# Patient Record
Sex: FEMALE | Race: BLACK OR AFRICAN AMERICAN | NOT HISPANIC OR LATINO | Employment: UNEMPLOYED | URBAN - METROPOLITAN AREA
[De-identification: names, ages, dates, MRNs, and addresses within clinical notes are randomized per-mention and may not be internally consistent; named-entity substitution may affect disease eponyms.]

---

## 2019-03-07 ENCOUNTER — ANCILLARY PROCEDURE (OUTPATIENT)
Dept: BONE DENSITY | Facility: CLINIC | Age: 59
End: 2019-03-07
Attending: FAMILY MEDICINE
Payer: COMMERCIAL

## 2019-03-07 DIAGNOSIS — Z78.0 POST-MENOPAUSAL: ICD-10-CM

## 2019-03-07 PROCEDURE — 77081 DXA BONE DENSITY APPENDICULR: CPT

## 2019-03-07 PROCEDURE — 77080 DXA BONE DENSITY AXIAL: CPT

## 2019-10-11 ENCOUNTER — TRANSFERRED RECORDS (OUTPATIENT)
Dept: HEALTH INFORMATION MANAGEMENT | Facility: CLINIC | Age: 59
End: 2019-10-11

## 2019-11-19 ENCOUNTER — ANCILLARY PROCEDURE (OUTPATIENT)
Dept: GENERAL RADIOLOGY | Facility: CLINIC | Age: 59
End: 2019-11-19
Attending: FAMILY MEDICINE
Payer: COMMERCIAL

## 2019-11-19 ENCOUNTER — OFFICE VISIT (OUTPATIENT)
Dept: ORTHOPEDICS | Facility: CLINIC | Age: 59
End: 2019-11-19
Payer: COMMERCIAL

## 2019-11-19 DIAGNOSIS — M25.561 ACUTE PAIN OF RIGHT KNEE: Primary | ICD-10-CM

## 2019-11-19 DIAGNOSIS — M25.511 ACUTE PAIN OF RIGHT SHOULDER: ICD-10-CM

## 2019-11-19 RX ORDER — DICLOFENAC SODIUM 75 MG/1
75 TABLET, DELAYED RELEASE ORAL 2 TIMES DAILY PRN
Qty: 60 TABLET | Refills: 1 | Status: SHIPPED | OUTPATIENT
Start: 2019-11-19 | End: 2021-12-07

## 2019-11-19 NOTE — PROGRESS NOTES
SPORTS & ORTHOPEDIC WALK-IN VISIT 11/19/2019    Primary Care Physician: DrMarcia     Has fallen 3 times in the past that has bothered her knee. Most recent fall was in August. Pain has consistently gotten worse since the last fall.  Notices clicking in her knee.    Also notices some R shoulder pain as well due to the most recent fall. Having significant difficulty carrying or using her right arm.    Has received MRI and XR of her knee (not in the system).  Referred to orthopedics for continued care.  When asked where pain is, she gestures to the general area of her knee.    Reason for visit:     What part of your body is injured / painful?  right knee    What caused the injury /pain? Fall    How long ago did your injury occur or pain begin? several months ago    What are your most bothersome symptoms? Pain    How would you characterize your symptom?  aching and dull    What makes your symptoms better? Rest    What makes your symptoms worse? Standing, Walking and Other: stairs    Have you been previously seen for this problem? Yes, [facility]    Medical History:    Any recent changes to your medical history? No    Any new medication prescribed since last visit? No    Have you had surgery on this body part before? No    Social History:    Occupation: not working    Handedness: Right    Exercise: None    Review of Systems:    Do you have fever, chills, weight loss? No    Do you have any vision problems? No    Do you have any chest pain or edema? No    Do you have any shortness of breath or wheezing?  No    Do you have stomach problems? No    Do you have any numbness or focal weakness? No    Do you have diabetes? No    Do you have problems with bleeding or clotting? No    Do you have an rashes or other skin lesions? No       CHIEF COMPLAINT:  Pain of the Right Knee       HISTORY OF PRESENT ILLNESS  Ms. Rocha is a pleasant 59 year old year old female who presents to clinic today with right knee pain and right shoulder  pain.  Radha has had right knee pain for the past few years, much worse as of the past few months.  She suffered a fall in August that incited this pain, she has suffered 2 falls since.  She does not recall striking her knee she may have twisted the knee during the fall, she is unsure.  She points to her generally, tenderness throughout.  This is worse with bearing weight, especially worse if she walks stairs.  During a fall she also fell on her right side, she has had pain in her general lateral and anterior shoulder since then.  She has pain with most movement, especially when flexing her arm above her head.  This is her dominant arm.        Additional history: as documented    MEDICAL HISTORY  Patient Active Problem List   Diagnosis   (none) - all problems resolved or deleted       Current Outpatient Medications   Medication Sig Dispense Refill     erythromycin (ROMYCIN) ophthalmic ointment Place 0.25 inches into the right eye At Bedtime 3.5 g 1     ibuprofen (ADVIL,MOTRIN) 600 MG tablet Take 1 tablet (600 mg) by mouth every 6 hours as needed for moderate pain 60 tablet 5       No Known Allergies    No family history on file.    Additional medical/Social/Surgical histories reviewed in [facility] and updated as appropriate.        PHYSICAL EXAM      General  - normal appearance, in no obvious distress  CV  - normal popliteal pulse  Pulm  - normal respiratory pattern, non-labored  Musculoskeletal - right knee  - stance: mildly antalgic gait  - inspection: generalized swelling  - palpation: generally tender, most so anteriorly  - ROM: 100 degrees flexion, 0 degrees extension, painful active ROM  - special tests:  Unable to perform    Musculoskeletal - right shoulder  - inspection: normal bone and joint alignment, no obvious deformity, no scapular winging, no AC step-off  - palpation: tender RC insertion, ACJ, biceps origin   - ROM: painful and limited flexion  - strength: 5/5  strength, 5/5 in all shoulder planes  -  special tests:  (-) Speed's  (+) Neer  (+) Hawkin's  (+) Eduardo's    Neuro  - no sensory or motor deficit, grossly normal coordination, normal muscle tone  Skin  - no ecchymosis, erythema, warmth, or induration, no obvious rash  Psych  - interactive, appropriate, normal mood and affect               ASSESSMENT & PLAN  Ms. Rocha is a 59 year old year old female who presents to clinic today with right knee pain and right shoulder pain that are both acute on chronic.    I ordered and reviewed x-rays of her right knee and right shoulder.  Her right knee x-ray does reveal generalized osteoarthritis with no obvious acute issue.  Her right shoulder x-ray shows no acute issue but does show some superior lateral humeral head enthesophyte formation.    Her knee pain is likely secondary to an arthritic flare or bone bruise that may have been caused by her fall, or at least exacerbated.  We discussed the utility of protection with a brace, anti-inflammatory usage, and intra-articular injections.  I did provide her brace, she should wear this at all times when weightbearing.    With regards to her shoulder she has clinical evidence of rotator cuff tendinopathy.  I am prescribing her a brief course of diclofenac for both of these issues.  We also discussed corticosteroid injections and physical therapy, both of which I do think she would benefit from.  She declined physical therapy today.    It was a pleasure seeing Radha today.    Javier Elise DO, [facility]  Primary Care Sports Medicine      This note was constructed using Dragon dictation software, please excuse any minor errors in spelling, grammar, or syntax.

## 2019-11-19 NOTE — LETTER
11/19/2019       RE: Radha Rocha  [address] [address]     Dear Colleague,    Thank you for referring your patient, Radha Rocha, to the [facility] SPORTS AND ORTHOPAEDIC WALK IN CLINIC at [facility]. Please see a copy of my visit note below.          SPORTS & ORTHOPEDIC WALK-IN VISIT 11/19/2019    Primary Care Physician:      Has fallen 3 times in the past that has bothered her knee. Most recent fall was in August. Pain has consistently gotten worse since the last fall.  Notices clicking in her knee.    Also notices some R shoulder pain as well due to the most recent fall. Having significant difficulty carrying or using her right arm.    Has received MRI and XR of her knee (not in the system).  Referred to orthopedics for continued care.  When asked where pain is, she gestures to the general area of her knee.    Reason for visit:     What part of your body is injured / painful?  right knee    What caused the injury /pain? Fall    How long ago did your injury occur or pain begin? several months ago    What are your most bothersome symptoms? Pain    How would you characterize your symptom?  aching and dull    What makes your symptoms better? Rest    What makes your symptoms worse? Standing, Walking and Other: stairs    Have you been previously seen for this problem? Yes, [facility]    Medical History:    Any recent changes to your medical history? No    Any new medication prescribed since last visit? No    Have you had surgery on this body part before? No    Social History:    Occupation: not working    Handedness: Right    Exercise: None    Review of Systems:    Do you have fever, chills, weight loss? No    Do you have any vision problems? No    Do you have any chest pain or edema? No    Do you have any shortness of breath or wheezing?  No    Do you have stomach problems? No    Do you have any numbness or focal weakness? No    Do you have diabetes?  No    Do you have problems with bleeding or clotting? No    Do you have an rashes or other skin lesions? No       CHIEF COMPLAINT:  Pain of the Right Knee       HISTORY OF PRESENT ILLNESS  Ms. Rocha is a pleasant 59 year old year old female who presents to clinic today with right knee pain and right shoulder pain.  Radha has had right knee pain for the past few years, much worse as of the past few months.  She suffered a fall in August that incited this pain, she has suffered 2 falls since.  She does not recall striking her knee she may have twisted the knee during the fall, she is unsure.  She points to her generally, tenderness throughout.  This is worse with bearing weight, especially worse if she walks stairs.  During a fall she also fell on her right side, she has had pain in her general lateral and anterior shoulder since then.  She has pain with most movement, especially when flexing her arm above her head.  This is her dominant arm.        Additional history: as documented    MEDICAL HISTORY  Patient Active Problem List   Diagnosis   (none) - all problems resolved or deleted       Current Outpatient Medications   Medication Sig Dispense Refill     erythromycin (ROMYCIN) ophthalmic ointment Place 0.25 inches into the right eye At Bedtime 3.5 g 1     ibuprofen (ADVIL,MOTRIN) 600 MG tablet Take 1 tablet (600 mg) by mouth every 6 hours as needed for moderate pain 60 tablet 5       No Known Allergies    No family history on file.    Additional medical/Social/Surgical histories reviewed in [facility] and updated as appropriate.        PHYSICAL EXAM      General  - normal appearance, in no obvious distress  CV  - normal popliteal pulse  Pulm  - normal respiratory pattern, non-labored  Musculoskeletal - right knee  - stance: mildly antalgic gait  - inspection: generalized swelling  - palpation: generally tender, most so anteriorly  - ROM: 100 degrees flexion, 0 degrees extension, painful active ROM  - special  tests:  Unable to perform    Musculoskeletal - right shoulder  - inspection: normal bone and joint alignment, no obvious deformity, no scapular winging, no AC step-off  - palpation: tender RC insertion, ACJ, biceps origin   - ROM: painful and limited flexion  - strength: 5/5  strength, 5/5 in all shoulder planes  - special tests:  (-) Speed's  (+) Neer  (+) Hawkin's  (+) Eduardo's    Neuro  - no sensory or motor deficit, grossly normal coordination, normal muscle tone  Skin  - no ecchymosis, erythema, warmth, or induration, no obvious rash  Psych  - interactive, appropriate, normal mood and affect               ASSESSMENT & PLAN  Ms. Rocha is a 59 year old year old female who presents to clinic today with right knee pain and right shoulder pain that are both acute on chronic.    I ordered and reviewed x-rays of her right knee and right shoulder.  Her right knee x-ray does reveal generalized osteoarthritis with no obvious acute issue.  Her right shoulder x-ray shows no acute issue but does show some superior lateral humeral head enthesophyte formation.    Her knee pain is likely secondary to an arthritic flare or bone bruise that may have been caused by her fall, or at least exacerbated.  We discussed the utility of protection with a brace, anti-inflammatory usage, and intra-articular injections.  I did provide her brace, she should wear this at all times when weightbearing.    With regards to her shoulder she has clinical evidence of rotator cuff tendinopathy.  I am prescribing her a brief course of diclofenac for both of these issues.  We also discussed corticosteroid injections and physical therapy, both of which I do think she would benefit from.  She declined physical therapy today.    It was a pleasure seeing Radha today.    Javier Elise DO, [facility]  Primary Care Sports Medicine      This note was constructed using Dragon dictation software, please excuse any minor errors in spelling, grammar, or syntax.

## 2020-10-29 ENCOUNTER — HOSPITAL ENCOUNTER (OUTPATIENT)
Dept: CT IMAGING | Facility: CLINIC | Age: 60
Discharge: HOME | End: 2020-10-29
Attending: FAMILY MEDICINE | Admitting: FAMILY MEDICINE
Payer: COMMERCIAL

## 2020-10-29 DIAGNOSIS — G89.29 CHRONIC INTRACTABLE HEADACHE, UNSPECIFIED HEADACHE TYPE: ICD-10-CM

## 2020-10-29 DIAGNOSIS — R51.9 CHRONIC INTRACTABLE HEADACHE, UNSPECIFIED HEADACHE TYPE: ICD-10-CM

## 2020-10-29 PROCEDURE — 70450 CT HEAD/BRAIN W/O DYE: CPT | Mod: 26 | Performed by: RADIOLOGY

## 2020-10-29 PROCEDURE — 70450 CT HEAD/BRAIN W/O DYE: CPT

## 2021-12-01 ENCOUNTER — MEDICAL CORRESPONDENCE (OUTPATIENT)
Dept: HEALTH INFORMATION MANAGEMENT | Facility: CLINIC | Age: 61
End: 2021-12-01
Payer: COMMERCIAL

## 2021-12-01 ENCOUNTER — TRANSCRIBE ORDERS (OUTPATIENT)
Dept: OTHER | Age: 61
End: 2021-12-01
Payer: COMMERCIAL

## 2021-12-01 DIAGNOSIS — G89.29 CHRONIC PAIN OF RIGHT KNEE: Primary | ICD-10-CM

## 2021-12-01 DIAGNOSIS — M25.561 CHRONIC PAIN OF RIGHT KNEE: Primary | ICD-10-CM

## 2021-12-02 NOTE — TELEPHONE ENCOUNTER
DIAGNOSIS: chronic pain right knee,,no imaging   APPOINTMENT DATE: 12.7.21   NOTES STATUS DETAILS   OFFICE NOTE from referring provider Care Everywhere 12.1.21 Dr hu Briones, [facility]   10.13.21  9.11.19   OFFICE NOTE from other specialist Internal 11.19.19 Dr Javier Elise, [facility]   DISCHARGE SUMMARY from hospital N/A    DISCHARGE REPORT from the ER N/A    OPERATIVE REPORT N/A    EMG report N/A    MEDICATION LIST Internal    MRI PACS 10.11.19 R knee   DEXA (osteoporosis/bone health) Internal    CT SCAN N/A    XRAYS (IMAGES & REPORTS) Internal 11.19.19 R knee     Action 12.2.21 10:30 AM YUNIEL    Action Taken Called [facility] to have image pushed

## 2021-12-06 DIAGNOSIS — M25.569 KNEE PAIN: Primary | ICD-10-CM

## 2021-12-06 NOTE — PROGRESS NOTES
"ASSESSMENT/PLAN:    (M17.0) Primary osteoarthritis of knees, bilateral  (primary encounter diagnosis)  Comment: reviewed exam and imaging at length; bilateral cortisone today; ace wraps; meds adjusted; encouraged PT; f/u in 3 months; anticipatory guidance given; live  present through entire visit   Plan: lidocaine (PF) (XYLOCAINE) 1 % injection, triamcinolone (KENALOG-40) 40 MG/ML injection,         Large Joint Injection: bilateral knee, PHYSICAL THERAPY REFERRAL (Internal), meloxicam (MOBIC) 15 MG tablet            Alon Cee MD  December 7, 2021  12:25 PM          Pt is a 61 year old female referred by Dr Briones here today for:     Right Knee pain :   Duration? 3 years   Injury/ Inciting activity? Pt has fallen before but cannot remember a specific injury to the knee. Pt notes pain while kneeling down. Feeling as if something is \"poking her\"  Pop? No   Swelling? Sometimes   Limited motion? When she is having a flare up she notices a decrease in flexion   Locking/ Catching? No   Giving way/ instability? Yes   Imaging? 11/2019 and today   Treatment? Pain medication, ice, heat; does not know the name -> per record review was capsaicin and tylenol -> no relief     Pain w/ prolonged walking; difficulty w/ stairs     No past medical history on file.   Past Surgical History:   Procedure Laterality Date     EYE SURGERY      Right eye removal      Current Outpatient Medications   Medication Sig Dispense Refill     erythromycin (ROMYCIN) ophthalmic ointment Place 0.25 inches into the right eye At Bedtime 3.5 g 1      No Known Allergies     ROS:   Gen- no fevers/chills   Rheum - no morning stiffness   Derm - no rash/ redness   Neuro - no numbness, no tingling   Remainder of ROS negative.     Exam:     Bilateral Knees:   ROM: 0-100; Crepitus: YES   Effusion: NO ; Swelling: NO   Strength: Full in flexion/ extension   Tenderness: Patella - Yes Medial joint line - Yes; Lateral joint line - Yes; Quad tendon - NO; " Patellar tendon- YES; Hamstring - YES.   Cruciates:  Lachman - neg   Collaterals: varus -neg/valgus -neg.   Patella: patellar compression - neg; single leg bend- pos  Meniscus: Marley - pos      [place] [facility] [place] patellofemoral [place]       Procedure Note:  The pt was verbally consented. The R knee was sterilely prepped in usual fashion. 1cc of 40mg/mL Kenalog and 4 cc of 1% lidocaine was injected via lateral approach without complication. Pt tolerated procedure well.     The pt was verbally consented. The L knee was sterilely prepped in usual fashion. 1cc of 40mg/mL Kenalog and 4 cc of 1% lidocaine was injected via lateral approach without complication. Pt tolerated procedure well.     Large Joint Injection: bilateral knee    Date/Time: 12/7/2021 12:17 PM  Performed by: Alon Cee MD  Authorized by: Alon Cee MD     Indications:  Pain  Needle Size:  22 G  Guidance: landmark guided    Approach:  Anterolateral  Location:  Knee  Laterality:  Bilateral      Medications (Right):  40 mg triamcinolone 40 MG/ML; 4 mL lidocaine (PF) 1 %  Medications (Left):  40 mg triamcinolone 40 MG/ML; 4 mL lidocaine (PF) 1 %  Outcome:  Tolerated well, no immediate complications  Procedure discussed: discussed risks, benefits, and alternatives    Consent Given by:  Patient  Timeout: timeout called immediately prior to procedure    Prep: patient was prepped and draped in usual sterile fashion     Rj Wright ATC on 12/7/2021 at 12:19 PM

## 2021-12-07 ENCOUNTER — PRE VISIT (OUTPATIENT)
Dept: ORTHOPEDICS | Facility: CLINIC | Age: 61
End: 2021-12-07

## 2021-12-07 ENCOUNTER — ANCILLARY PROCEDURE (OUTPATIENT)
Dept: GENERAL RADIOLOGY | Facility: CLINIC | Age: 61
End: 2021-12-07
Attending: FAMILY MEDICINE
Payer: COMMERCIAL

## 2021-12-07 ENCOUNTER — OFFICE VISIT (OUTPATIENT)
Dept: ORTHOPEDICS | Facility: CLINIC | Age: 61
End: 2021-12-07
Payer: COMMERCIAL

## 2021-12-07 DIAGNOSIS — M25.569 KNEE PAIN: ICD-10-CM

## 2021-12-07 DIAGNOSIS — M17.0 PRIMARY OSTEOARTHRITIS OF KNEES, BILATERAL: Primary | ICD-10-CM

## 2021-12-07 PROCEDURE — 99202 OFFICE O/P NEW SF 15 MIN: CPT | Mod: 25 | Performed by: FAMILY MEDICINE

## 2021-12-07 PROCEDURE — 20610 DRAIN/INJ JOINT/BURSA W/O US: CPT | Mod: 50 | Performed by: FAMILY MEDICINE

## 2021-12-07 PROCEDURE — 73562 X-RAY EXAM OF KNEE 3: CPT | Mod: LT | Performed by: RADIOLOGY

## 2021-12-07 RX ORDER — MELOXICAM 15 MG/1
15 TABLET ORAL DAILY
Qty: 30 TABLET | Refills: 3 | Status: SHIPPED | OUTPATIENT
Start: 2021-12-07 | End: 2021-12-07

## 2021-12-07 RX ORDER — TRIAMCINOLONE ACETONIDE 40 MG/ML
40 INJECTION, SUSPENSION INTRA-ARTICULAR; INTRAMUSCULAR
Status: SHIPPED | OUTPATIENT
Start: 2021-12-07

## 2021-12-07 RX ORDER — MELOXICAM 15 MG/1
15 TABLET ORAL DAILY
Qty: 30 TABLET | Refills: 3 | Status: SHIPPED | OUTPATIENT
Start: 2021-12-07 | End: 2022-03-08

## 2021-12-07 RX ORDER — LIDOCAINE HYDROCHLORIDE 10 MG/ML
4 INJECTION, SOLUTION EPIDURAL; INFILTRATION; INTRACAUDAL; PERINEURAL
Status: SHIPPED | OUTPATIENT
Start: 2021-12-07

## 2021-12-07 RX ADMIN — LIDOCAINE HYDROCHLORIDE 4 ML: 10 INJECTION, SOLUTION EPIDURAL; INFILTRATION; INTRACAUDAL; PERINEURAL at 12:17

## 2021-12-07 RX ADMIN — TRIAMCINOLONE ACETONIDE 40 MG: 40 INJECTION, SUSPENSION INTRA-ARTICULAR; INTRAMUSCULAR at 12:17

## 2021-12-07 NOTE — NURSING NOTE
[facility] SPORTS [address] [address]  Dept: [phone]  ______________________________________________________________________________    Patient: Radha Rocha   : 1960   MRN: [id number]   2021    INVASIVE PROCEDURE SAFETY CHECKLIST    Date: 21   Procedure: Bilateral Knee IA CSI jaylenealog  Patient Name: Radha Rocha  MRN: [id number]  YOB: 1960    Action: Complete sections as appropriate. Any discrepancy results in a HARD COPY until resolved.     PRE PROCEDURE:  Patient ID verified with 2 identifiers (name and  or MRN): Yes  Procedure and site verified with patient/designee (when able): Yes  Accurate consent documentation in medical record: Yes  H&P (or appropriate assessment) documented in medical record: Yes  H&P must be up to 20 days prior to procedure and updates within 24 hours of procedure as applicable: NA  Relevant diagnostic and radiology test results appropriately labeled and displayed as applicable: Yes  Procedure site(s) marked with provider initials: NA    TIMEOUT:  Time-Out performed immediately prior to starting procedure, including verbal and active participation of all team members addressing the following:Yes  * Correct patient identify  * Confirmed that the correct side and site are marked  * An accurate procedure consent form  * Agreement on the procedure to be done  * Correct patient position  * Relevant images and results are properly labeled and appropriately displayed  * The need to administer antibiotics or fluids for irrigation purposes during the procedure as applicable   * Safety precautions based on patient history or medication use    DURING PROCEDURE: Verification of correct person, site, and procedures any time the responsibility for care of the patient is transferred to another member of the care team.       Prior to injection, verified patient identity using patient's name and date of  birth.  Due to injection administration, patient instructed to remain in clinic for 15 minutes  afterwards, and to report any adverse reaction to me immediately.    Joint injection was performed.      Drug Amount Wasted:  None.  Vial/Syringe: Single dose vial  Expiration Date:  6/1/23      Rj Wright, [facility]  December 7, 2021

## 2021-12-07 NOTE — LETTER
"  12/7/2021      RE: Radha MALDONADO Ja  [address] [address]       ASSESSMENT/PLAN:    (M17.0) Primary osteoarthritis of knees, bilateral  (primary encounter diagnosis)  Comment: reviewed exam and imaging at length; bilateral cortisone today; ace wraps; meds adjusted; encouraged PT; f/u in 3 months; anticipatory guidance given; live  present through entire visit   Plan: lidocaine (PF) (XYLOCAINE) 1 % injection, triamcinolone (KENALOG-40) 40 MG/ML injection,         Large Joint Injection: bilateral knee, PHYSICAL THERAPY REFERRAL (Internal), meloxicam (MOBIC) 15 MG tablet            Alon Cee MD  December 7, 2021  12:25 PM          Pt is a 61 year old female referred by Dr Briones here today for:     Right Knee pain :   Duration? 3 years   Injury/ Inciting activity? Pt has fallen before but cannot remember a specific injury to the knee. Pt notes pain while kneeling down. Feeling as if something is \"poking her\"  Pop? No   Swelling? Sometimes   Limited motion? When she is having a flare up she notices a decrease in flexion   Locking/ Catching? No   Giving way/ instability? Yes   Imaging? 11/2019 and today   Treatment? Pain medication, ice, heat; does not know the name -> per record review was capsaicin and tylenol -> no relief     Pain w/ prolonged walking; difficulty w/ stairs     No past medical history on file.   Past Surgical History:   Procedure Laterality Date     EYE SURGERY      Right eye removal      Current Outpatient Medications   Medication Sig Dispense Refill     erythromycin (ROMYCIN) ophthalmic ointment Place 0.25 inches into the right eye At Bedtime 3.5 g 1      No Known Allergies     ROS:   Gen- no fevers/chills   Rheum - no morning stiffness   Derm - no rash/ redness   Neuro - no numbness, no tingling   Remainder of ROS negative.     Exam:     Bilateral Knees:   ROM: 0-100; Crepitus: YES   Effusion: NO ; Swelling: NO   Strength: Full in flexion/ extension   Tenderness: " Patella - Yes Medial joint line - Yes; Lateral joint line - Yes; Quad tendon - NO; Patellar tendon- YES; Hamstring - YES.   Cruciates:  Lachman - neg   Collaterals: varus -neg/valgus -neg.   Patella: patellar compression - neg; single leg bend- pos  Meniscus: Marley - pos      Xray bilateral knees - 12/7/21 at [facility] [place]    + patellofemoral OA       Procedure Note:  The pt was verbally consented. The R knee was sterilely prepped in usual fashion. 1cc of 40mg/mL Kenalog and 4 cc of 1% lidocaine was injected via lateral approach without complication. Pt tolerated procedure well.     The pt was verbally consented. The L knee was sterilely prepped in usual fashion. 1cc of 40mg/mL Kenalog and 4 cc of 1% lidocaine was injected via lateral approach without complication. Pt tolerated procedure well.     Large Joint Injection: bilateral knee    Date/Time: 12/7/2021 12:17 PM  Performed by: Alon Cee MD  Authorized by: Alon Cee MD     Indications:  Pain  Needle Size:  22 G  Guidance: landmark guided    Approach:  Anterolateral  Location:  Knee  Laterality:  Bilateral      Medications (Right):  40 mg triamcinolone 40 MG/ML; 4 mL lidocaine (PF) 1 %  Medications (Left):  40 mg triamcinolone 40 MG/ML; 4 mL lidocaine (PF) 1 %  Outcome:  Tolerated well, no immediate complications  Procedure discussed: discussed risks, benefits, and alternatives    Consent Given by:  Patient  Timeout: timeout called immediately prior to procedure    Prep: patient was prepped and draped in usual sterile fashion     Rj Wright ATC on 12/7/2021 at 12:19 PM              Alon Cee MD

## 2021-12-07 NOTE — LETTER
Date:December 9, 2021      Patient was self referred, no letter generated. Do not send.        [facility] Health Information

## 2022-01-16 ENCOUNTER — APPOINTMENT (OUTPATIENT)
Dept: GENERAL RADIOLOGY | Facility: CLINIC | Age: 62
End: 2022-01-16
Attending: STUDENT IN AN ORGANIZED HEALTH CARE EDUCATION/TRAINING PROGRAM
Payer: COMMERCIAL

## 2022-01-16 ENCOUNTER — HOSPITAL ENCOUNTER (EMERGENCY)
Facility: CLINIC | Age: 62
Discharge: HOME | End: 2022-01-17
Attending: STUDENT IN AN ORGANIZED HEALTH CARE EDUCATION/TRAINING PROGRAM | Admitting: STUDENT IN AN ORGANIZED HEALTH CARE EDUCATION/TRAINING PROGRAM
Payer: COMMERCIAL

## 2022-01-16 DIAGNOSIS — B34.9 VIRAL SYNDROME: ICD-10-CM

## 2022-01-16 PROCEDURE — 71046 X-RAY EXAM CHEST 2 VIEWS: CPT

## 2022-01-16 PROCEDURE — 99284 EMERGENCY DEPT VISIT MOD MDM: CPT | Performed by: STUDENT IN AN ORGANIZED HEALTH CARE EDUCATION/TRAINING PROGRAM

## 2022-01-16 PROCEDURE — 99284 EMERGENCY DEPT VISIT MOD MDM: CPT | Mod: 25 | Performed by: STUDENT IN AN ORGANIZED HEALTH CARE EDUCATION/TRAINING PROGRAM

## 2022-01-16 PROCEDURE — 87636 SARSCOV2 & INF A&B AMP PRB: CPT | Performed by: STUDENT IN AN ORGANIZED HEALTH CARE EDUCATION/TRAINING PROGRAM

## 2022-01-16 PROCEDURE — C9803 HOPD COVID-19 SPEC COLLECT: HCPCS | Performed by: STUDENT IN AN ORGANIZED HEALTH CARE EDUCATION/TRAINING PROGRAM

## 2022-01-16 RX ORDER — OMEPRAZOLE 40 MG/1
40 CAPSULE, DELAYED RELEASE ORAL DAILY
COMMUNITY
Start: 2021-10-13

## 2022-01-16 RX ORDER — AMLODIPINE BESYLATE 5 MG/1
1 TABLET ORAL DAILY
COMMUNITY
Start: 2021-10-13

## 2022-01-16 ASSESSMENT — MIFFLIN-ST. JEOR: SCORE: 1420.9

## 2022-01-17 VITALS
WEIGHT: 185 LBS | BODY MASS INDEX: 29.73 KG/M2 | DIASTOLIC BLOOD PRESSURE: 80 MMHG | HEIGHT: 66 IN | HEART RATE: 69 BPM | RESPIRATION RATE: 20 BRPM | SYSTOLIC BLOOD PRESSURE: 120 MMHG | OXYGEN SATURATION: 100 % | TEMPERATURE: 97.9 F

## 2022-01-17 LAB
FLUAV RNA SPEC QL NAA+PROBE: POSITIVE
FLUBV RNA RESP QL NAA+PROBE: NEGATIVE
SARS-COV-2 RNA RESP QL NAA+PROBE: NEGATIVE

## 2022-01-17 PROCEDURE — 250N000013 HC RX MED GY IP 250 OP 250 PS 637: Performed by: STUDENT IN AN ORGANIZED HEALTH CARE EDUCATION/TRAINING PROGRAM

## 2022-01-17 RX ORDER — BENZONATATE 100 MG/1
100 CAPSULE ORAL ONCE
Status: COMPLETED | OUTPATIENT
Start: 2022-01-17 | End: 2022-01-17

## 2022-01-17 RX ORDER — BENZONATATE 200 MG/1
200 CAPSULE ORAL 3 TIMES DAILY PRN
Qty: 42 CAPSULE | Refills: 0 | Status: SHIPPED | OUTPATIENT
Start: 2022-01-17 | End: 2022-01-31

## 2022-01-17 RX ADMIN — BENZONATATE 100 MG: 100 CAPSULE ORAL at 00:39

## 2022-01-17 ASSESSMENT — ENCOUNTER SYMPTOMS
DYSURIA: 0
DIZZINESS: 0
DIARRHEA: 0
SHORTNESS OF BREATH: 0
VOMITING: 0
BACK PAIN: 0
FLANK PAIN: 0
WOUND: 0
CONSTIPATION: 0
WEAKNESS: 0
NAUSEA: 0
FEVER: 0
FACIAL SWELLING: 0
WHEEZING: 0
EYE REDNESS: 0
NECK PAIN: 0
EYE DISCHARGE: 0
RHINORRHEA: 0
ABDOMINAL PAIN: 0
HEADACHES: 1
HEMATURIA: 0
EYE PAIN: 0
COUGH: 1
CHILLS: 0
NUMBNESS: 0
SORE THROAT: 0

## 2022-01-17 NOTE — ED PROVIDER NOTES
[facility] EMERGENCY DEPARTMENT ([facility])  1/16/22    History     Chief Complaint   Patient presents with     Cough     c/o non productive cough x3 weeks, chest discomfort with coughing, dizziness, and headache.      EMERSON Rocha is a 62 year old female with PMH significant for hypertension and osteoarthritis who presents to the Emergency Department for evaluation of dry cough for the past 3 weeks.  Patient also reports chest discomfort due to the frequent coughing as well as a headache.  Patient states that the primary concern is the cough for this extent of time.    Past Medical History  Past Medical History:   Diagnosis Date     Hypertension      Past Surgical History:   Procedure Laterality Date     EYE SURGERY      Right eye removal     amLODIPine (NORVASC) 5 MG tablet  benzonatate (TESSALON) 200 MG capsule  omeprazole (PRILOSEC) 40 MG DR capsule  erythromycin (ROMYCIN) ophthalmic ointment  meloxicam (MOBIC) 15 MG tablet      Allergies   Allergen Reactions     Loratadine Itching     Tizanidine Itching     Past medical history, past surgical history, medications, and allergies were reviewed with the patient. Additional pertinent items: None    Family History  History reviewed. No pertinent family history.  Family history was reviewed with the patient. Additional pertinent items: None    Social History  Social History     Tobacco Use     Smoking status: Never Smoker     Smokeless tobacco: Never Used   Substance Use Topics     Alcohol use: No     Drug use: No      Social history was reviewed with the patient. Additional pertinent items: None      Review of Systems   Constitutional: Negative for chills and fever.   HENT: Negative for ear pain, facial swelling, rhinorrhea and sore throat.    Eyes: Negative for pain, discharge and redness.   Respiratory: Positive for cough. Negative for shortness of breath and wheezing.    Cardiovascular: Positive for chest pain.   Gastrointestinal: Negative for  "abdominal pain, constipation, diarrhea, nausea and vomiting.   Genitourinary: Negative for dysuria, flank pain, hematuria, vaginal bleeding and vaginal discharge.   Musculoskeletal: Negative for back pain and neck pain.   Skin: Negative for rash and wound.   Neurological: Positive for headaches. Negative for dizziness, weakness and numbness.     A complete review of systems was performed with pertinent positives and negatives noted in the HPI, and all other systems negative.    Physical Exam   BP: 128/89  Pulse: 73  Temp: 97.9  F (36.6  C)  Resp: 20  Height: 167.6 cm (5' 6\")  Weight: 83.9 kg (185 lb)  SpO2: 99 %  Physical Exam  Constitutional:       General: She is not in acute distress.     Appearance: Normal appearance. She is not diaphoretic.   HENT:      Head: Normocephalic and atraumatic.      Nose: Nose normal.      Mouth/Throat:      Mouth: Mucous membranes are moist.      Pharynx: Oropharynx is clear. No oropharyngeal exudate.   Eyes:      General: Lids are normal. No scleral icterus.     Extraocular Movements: Extraocular movements intact.      Conjunctiva/sclera: Conjunctivae normal.      Pupils: Pupils are equal, round, and reactive to light.   Cardiovascular:      Rate and Rhythm: Normal rate and regular rhythm.      Pulses: Normal pulses.      Heart sounds: Normal heart sounds. No murmur heard.  No friction rub. No gallop.    Pulmonary:      Effort: Pulmonary effort is normal. No respiratory distress.      Breath sounds: Normal breath sounds. No stridor. No wheezing, rhonchi or rales.   Abdominal:      General: Bowel sounds are normal.      Palpations: Abdomen is soft.      Tenderness: There is no abdominal tenderness.   Musculoskeletal:         General: No tenderness. Normal range of motion.      Cervical back: Full passive range of motion without pain, normal range of motion and neck supple.   Skin:     General: Skin is warm and dry.      Capillary Refill: Capillary refill takes less than 2 seconds. "      Findings: No rash.   Neurological:      General: No focal deficit present.      Mental Status: She is oriented to person, place, and time. Mental status is at baseline.      GCS: GCS eye subscore is 4. GCS verbal subscore is 5. GCS motor subscore is 6.   Psychiatric:         Attention and Perception: Attention normal.         Mood and Affect: Mood normal.         Speech: Speech normal.         Behavior: Behavior normal.       ED Course      Procedures   12:17 AM  The patient was seen and examined by Earl Flynn MD in Room ED04.           Results for orders placed or performed during the hospital encounter of 01/16/22   XR Chest 2 Views     Status: None (Preliminary result)    Narrative    EXAM: XR CHEST 2 VW  LOCATION: [facility]  DATE/TIME: 1/16/2022 11:48 PM    INDICATION: Cough.  COMPARISON: None.      Impression    IMPRESSION: Mild cardiac enlargement. Pulmonary vascularity within normal limits. Lungs are clear with no acute infiltrates or consolidation. No significant bony abnormalities. Chest is otherwise negative. No acute findings.     Medications   benzonatate (TESSALON) capsule 100 mg (has no administration in time range)        Assessments & Plan (with Medical Decision Making)   This is a 62 year old female who presents to the ED for cough. I believe this is most likely secondary to a viral upper respiratory infection, possibly COVID19. Other less likely explanations include pneumonia, CHF, reactive airway disease, COPD, bronchial irritation, medication effect such as ACEI, foreign body aspiration or toxic exposure, malignancy, mediastinal mass or PE.   Evaluation and management will include CXR, influenza and COVID swab. She will be dismissed home with recommendations for supportive care and to follow up as needed with primary care, or return to the ED for worsening symptoms if needed.     CXR is negative for PNA.    Will discharge on tessalon  [place].    The patient's workup and evaluation during their Emergency Department stay was reviewed with the patient. She is comfortable going home based on our discussion with her. They are amenable to this plan. They will follow up with PCP in 3 days time. The signs/symptoms to prompt return to the Emergency Department were discussed with the patient and they expressed understanding. All questions were answered.       I have reviewed the nursing notes. I have reviewed the findings, diagnosis, plan and need for follow up with the patient.    New Prescriptions    BENZONATATE (TESSALON) 200 MG CAPSULE    Take 1 capsule (200 mg) by mouth 3 times daily as needed for cough       Final diagnoses:   Viral syndrome     I, Jose R Cox, am serving as a trained medical scribe to document services personally performed by Earl Flynn MD, based on the provider's statements to me.     I, Earl Flynn MD, was physically present and have reviewed and verified the accuracy of this note documented by Jose R Cox.    --  Earl Flynn MD  [facility] EMERGENCY DEPARTMENT  1/16/2022     Earl Flynn MD  01/17/22 0034

## 2022-01-17 NOTE — ED NOTES
Patient Verbalized understanding of discharge instructions including medication administration and recommended follow up care as noted on discharge instructions. Written discharge instructions given, denies any further questions. Prescription sent to pharmacy of choice

## 2022-01-17 NOTE — ED PROVIDER NOTES
"ED Provider Note  [facility]      History     Chief Complaint   Patient presents with     Cough     c/o non productive cough x3 weeks, chest discomfort with coughing, dizziness, and headache.      HPI  Radha Rocha is a 62 year old female with a history of GERD and HTN who presents with 3 weeks of worsening dry cough. Her cough has become constant in the past 2 days and her chest is hurting because of the constant coughing. She admits to headache and the cough is interrupting her sleep nightly. Her family members notice that she is snoring loudly when sleeping, which is not normal for her. She has never smoked before. No weight loss. She has tried taking OTC cough medication, but it has not helped. No recent travel.     Past Medical History  No past medical history on file.  Past Surgical History:   Procedure Laterality Date     EYE SURGERY      Right eye removal     amLODIPine (NORVASC) 5 MG tablet  omeprazole (PRILOSEC) 40 MG DR capsule  erythromycin (ROMYCIN) ophthalmic ointment  meloxicam (MOBIC) 15 MG tablet      Allergies   Allergen Reactions     Loratadine Itching     Tizanidine Itching     Family History  No family history on file.  Social History   Social History     Tobacco Use     Smoking status: Never Smoker     Smokeless tobacco: Never Used   Substance Use Topics     Alcohol use: No     Drug use: No      Past medical history, past surgical history, medications, allergies, family history, and social history were reviewed with the patient. No additional pertinent items.       Review of Systems  A complete review of systems was performed with pertinent positives and negatives noted in the HPI, and all other systems negative.    Physical Exam   BP: 128/89  Pulse: 73  Temp: 97.9  F (36.6  C)  Resp: 20  Height: 167.6 cm (5' 6\")  SpO2: 99 %  Physical Exam  GENERAL: no apparent distress  EYES: Conjunctiva are not injected, no discharge.  EARS: Left TM -no erythema, no effusion,  not " bulged.               Right TM -no erythema, no effusion,  not bulged. Wax present occluding part of the TM.   NOSE: no discharge, no sinus tenderness  THROAT: no erythema, no exudate, no lesions  NECK: supple, no adenopathy.  CARDIAC: regular rate and rhythm, no murmur  RESP: clear, no wheezing, no rales, no rhonchi. Lobar ascultation was clear.   ABD: soft, no distension, no tenderness    ED Course      Procedures                   Results for orders placed or performed during the hospital encounter of 01/16/22   XR Chest 2 Views     Status: None (Preliminary result)    Narrative    EXAM: XR CHEST 2 VW  LOCATION: [facility]  DATE/TIME: 1/16/2022 11:48 PM    INDICATION: Cough.  COMPARISON: None.      Impression    IMPRESSION: Mild cardiac enlargement. Pulmonary vascularity within normal limits. Lungs are clear with no acute infiltrates or consolidation. No significant bony abnormalities. Chest is otherwise negative. No acute findings.     Medications - No data to display     Assessments & Plan (with Medical Decision Making)   Radha Rocha is a 62 year old female with a history of GERD and HTN who presents with 3 weeks of worsening dry cough.    ddx includes bacterial pneumonia, viral infection, lung cancer. Her exam was unremarkable. She is vitally stable. Her chest xray did not show any pulmonary pathology. Her symptoms are most likely cause by a viral illness. Continue to intake a lot of fluids. Use Ibuprofen or Tylenol for pain. ***prescribed tessalon pearls    I have reviewed the nursing notes. I have reviewed the findings, diagnosis, plan and need for follow up with the patient.    New Prescriptions    No medications on file       Final diagnoses:   None       --  ***  [facility] EMERGENCY DEPARTMENT  1/16/2022

## 2022-03-07 NOTE — PROGRESS NOTES
ASSESSMENT/PLAN:    (M17.0) Primary osteoarthritis of knees, bilateral  (primary encounter diagnosis)  Comment: reviewed tx options at length; meds adjusted; will start PT; f/u in 2 months  Plan: Physical Therapy Referral, meloxicam (MOBIC) 15        MG tablet, cyclobenzaprine (FLEXERIL) 5 MG         tablet, diclofenac (VOLTAREN) 1 % topical gel          (H04.123) Dry eyes  Comment: refilled per pt request  Plan: carboxymethylcellulose (REFRESH PLUS) 0.5 % SOLN ophthalmic solution    Pt seen w/ Dionicio Lanier PGY2          Alon Cee MD  March 8, 2022  1:27 PM      Pt is a 62 year old female last seen on 12/7/2021 here for follow up of:     Bilateral knee OA -   Injection -- helped for 2 months   Since last time - then came back and is now severe   Meds    Mobic -- PRN. Every few days.   Not sleeping well due to the pain   Therapy -  No.   Wanting another injection? No        Per my last note:  (M17.0) Primary osteoarthritis of knees, bilateral  (primary encounter diagnosis)  Comment: reviewed exam and imaging at length; bilateral cortisone today; ace wraps; meds adjusted; encouraged PT; f/u in 3 months; anticipatory guidance given; live  present through entire visit   Plan: lidocaine (PF) (XYLOCAINE) 1 % injection, triamcinolone (KENALOG-40) 40 MG/ML injection,         Large Joint Injection: bilateral knee, PHYSICAL THERAPY REFERRAL (Internal), meloxicam (MOBIC) 15 MG tablet    Past Medical History:   Diagnosis Date     Hypertension       Current Outpatient Medications   Medication Sig Dispense Refill     amLODIPine (NORVASC) 5 MG tablet Take 1 tablet by mouth daily       erythromycin (ROMYCIN) ophthalmic ointment Place 0.25 inches into the right eye At Bedtime 3.5 g 1     meloxicam (MOBIC) 15 MG tablet Take 1 tablet (15 mg) by mouth daily Take with food 30 tablet 3     omeprazole (PRILOSEC) 40 MG DR capsule Take 40 mg by mouth daily        Allergies   Allergen Reactions     Loratadine Itching      Tizanidine Itching      ROS:   Gen- no fevers/chills   Rheum - no morning stiffness   Derm - no rash/ redness   Neuro - no numbness, no tingling   Remainder of ROS negative.     Exam:     Bilateral Knees:   ROM: 0-100; Crepitus: YES   Effusion: NO ; Swelling: NO   Strength: Full in flexion/ extension   Tenderness: Patella - Yes Medial joint line - Yes; Lateral joint line - Yes;   Quad tendon - NO; Patellar tendon- YES; Hamstring - YES.   Cruciates:  Lachman - neg   Collaterals: varus -neg/valgus -neg.   Patella: patellar compression - neg; single leg bend- pos  Meniscus: Marley - pos

## 2022-03-08 ENCOUNTER — OFFICE VISIT (OUTPATIENT)
Dept: ORTHOPEDICS | Facility: CLINIC | Age: 62
End: 2022-03-08
Payer: COMMERCIAL

## 2022-03-08 VITALS — WEIGHT: 185 LBS | BODY MASS INDEX: 29.73 KG/M2 | HEIGHT: 66 IN

## 2022-03-08 DIAGNOSIS — H04.123 DRY EYES: ICD-10-CM

## 2022-03-08 DIAGNOSIS — M17.0 PRIMARY OSTEOARTHRITIS OF KNEES, BILATERAL: Primary | ICD-10-CM

## 2022-03-08 PROCEDURE — 99213 OFFICE O/P EST LOW 20 MIN: CPT | Performed by: FAMILY MEDICINE

## 2022-03-08 RX ORDER — CYCLOBENZAPRINE HCL 5 MG
5 TABLET ORAL
Qty: 30 TABLET | Refills: 3 | Status: SHIPPED | OUTPATIENT
Start: 2022-03-08 | End: 2022-05-17

## 2022-03-08 RX ORDER — MELOXICAM 15 MG/1
15 TABLET ORAL DAILY
Qty: 30 TABLET | Refills: 3 | Status: SHIPPED | OUTPATIENT
Start: 2022-03-08 | End: 2022-05-17

## 2022-03-08 RX ORDER — CARBOXYMETHYLCELLULOSE SODIUM 5 MG/ML
1 SOLUTION/ DROPS OPHTHALMIC 4 TIMES DAILY
COMMUNITY
Start: 2020-11-27 | End: 2022-03-08

## 2022-03-08 RX ORDER — CARBOXYMETHYLCELLULOSE SODIUM 5 MG/ML
1 SOLUTION/ DROPS OPHTHALMIC 4 TIMES DAILY
Qty: 30 ML | Refills: 3 | Status: SHIPPED | OUTPATIENT
Start: 2022-03-08 | End: 2024-06-26

## 2022-03-08 NOTE — LETTER
3/8/2022      RE: Radha B Aj  [address]  [place] [address]       ASSESSMENT/PLAN:    (M17.0) Primary osteoarthritis of knees, bilateral  (primary encounter diagnosis)  Comment: reviewed tx options at length; meds adjusted; will start PT; f/u in 2 months  Plan: Physical Therapy Referral, meloxicam (MOBIC) 15        MG tablet, cyclobenzaprine (FLEXERIL) 5 MG         tablet, diclofenac (VOLTAREN) 1 % topical gel          (H04.123) Dry eyes  Comment: refilled per pt request  Plan: carboxymethylcellulose (REFRESH PLUS) 0.5 % SOLN ophthalmic solution    Pt seen w/ Dionicio Lanier PGY2          Alon Cee MD  March 8, 2022  1:27 PM      Pt is a 62 year old female last seen on 12/7/2021 here for follow up of:     Bilateral knee OA -   Injection -- helped for 2 months   Since last time - then came back and is now severe   Meds    Mobic -- PRN. Every few days.   Not sleeping well due to the pain   Therapy -  No.   Wanting another injection? No        Per my last note:  (M17.0) Primary osteoarthritis of knees, bilateral  (primary encounter diagnosis)  Comment: reviewed exam and imaging at length; bilateral cortisone today; ace wraps; meds adjusted; encouraged PT; f/u in 3 months; anticipatory guidance given; live  present through entire visit   Plan: lidocaine (PF) (XYLOCAINE) 1 % injection, triamcinolone (KENALOG-40) 40 MG/ML injection,         Large Joint Injection: bilateral knee, PHYSICAL THERAPY REFERRAL (Internal), meloxicam (MOBIC) 15 MG tablet    Past Medical History:   Diagnosis Date     Hypertension       Current Outpatient Medications   Medication Sig Dispense Refill     amLODIPine (NORVASC) 5 MG tablet Take 1 tablet by mouth daily       erythromycin (ROMYCIN) ophthalmic ointment Place 0.25 inches into the right eye At Bedtime 3.5 g 1     meloxicam (MOBIC) 15 MG tablet Take 1 tablet (15 mg) by mouth daily Take with food 30 tablet 3     omeprazole (PRILOSEC) 40 MG DR capsule  Take 40 mg by mouth daily        Allergies   Allergen Reactions     Loratadine Itching     Tizanidine Itching      ROS:   Gen- no fevers/chills   Rheum - no morning stiffness   Derm - no rash/ redness   Neuro - no numbness, no tingling   Remainder of ROS negative.     Exam:     Bilateral Knees:   ROM: 0-100; Crepitus: YES   Effusion: NO ; Swelling: NO   Strength: Full in flexion/ extension   Tenderness: Patella - Yes Medial joint line - Yes; Lateral joint line - Yes;   Quad tendon - NO; Patellar tendon- YES; Hamstring - YES.   Cruciates:  Lachman - neg   Collaterals: varus -neg/valgus -neg.   Patella: patellar compression - neg; single leg bend- pos  Meniscus: Marley - pos      Alon Cee MD

## 2022-03-08 NOTE — LETTER
Date:March 9, 2022      Patient was self referred, no letter generated. Do not send.        [facility] Health Information

## 2022-03-13 NOTE — PROGRESS NOTES
Physical Therapy Initial Examination/Evaluation  March 17, 2022    Radha Rocha is a 62 year old female referred to physical therapy by Alon Cee MD for treatment of Primary osteoarthritis of knees, bilateral with Precautions/Restrictions/MD instructions Eval and treat.     Therapist Impression:   Patient has complaints of RUBI R>L knee pain that is constant, worse with prolonged standing, walking, negotiating stairs and wakes her from sleep. Patient has history of multiple falls 1 - 2 years ago patient blames on poor balance.  Patient had an Iinjection 3 months ago that lasted 2 months, pain is back now. Patient found to have impaired gait, decreased and painful knee ROM, decreased hip strength. Patient given HEP with quad sets, SLR and standing hip ABD.     Subjective:  DOI/onset: 2 years ago  Acute Injury or Gradual Onset?: Acute injury onset  Mechanism of Injury: Fall Multiple falls  Related PMH: Knee OA   Imaging: X-ray - Mild patellofemoral degenerative changes bilaterally.  Chief Complaint/Functional Limitations: R knee pain and see below in therapy evaluation codes   Pain: rest 5 /10, activity 7/10 Location: anterior and posterior knee, tingling into foot Frequency: Constant Described as: dull and stabbing Previous Treatment: Pain medications and Corticosteroid injection Effect of prior treatment: fairAlleviated by: Nothing Progression of Symptoms: Gradually getting worse. Time of day when pain is worse: Night, Activity related and All times of the day/constant  Sleeping: Interrupted due to current issue - wakes up every night  Occupation: retired Job duties: prolonged sitting, prolonged walking  Current HEP/exercise regimen: walking, group fitness classes 2x/wk  Patient's goals are see chief complaints To have less pain    Other pertinent PMH/Red Flags: High Blood Pressure, Migraines/Headaches, Numbness/Tingling, Sleep Disorder/Apnea   Barriers at home/work: Transportation from daughter  Pertinent  Surgical History: eye, ear  Medications: heartburn  General health as reported by patient: fair  Return to MD:  5/17 appt    KNEE EVALUATION    Dynamic Movement Screen  Single leg stance observations: Difficulty with balance eyes open RUBI, requires mod UE support  Gait: Compensated Trandelenburg RUBI    Range of Motion      Knee ROM Extension Flexion   Left -2 118   Right -2 100      Hip and Knee Strength * = pain  MMT Left Right   Hip Abduction 4+/5 +/5   Knee Extension 5/5 4/5*   Knee flexion 5/5 5/5*     Knee MMT Quadriceps set Straight Leg Raise   Left Good Good   Right Poor Able     Swelling:  Not able to assess due to [employer] attire    Palpation  Left: No tenderness to palpation  Right: Moderate tenderness to palpation at posterior knee     Assessment/Plan:  Patient is a 62 year old female with both sides knee complaints.    Patient has the following significant findings with corresponding treatment plan.                Diagnosis 1:  RUBI knee pain  Pain -  hot/cold therapy and manual therapy  Decreased ROM/flexibility - manual therapy and therapeutic exercise  Decreased strength - therapeutic exercise and therapeutic activities  Impaired balance - neuro re-education and therapeutic activities  Impaired gait - gait training  Impaired muscle performance - neuro re-education  Decreased function - therapeutic activities    Therapy Evaluation Codes:   1) History comprised of:   Personal factors that impact the plan of care:      None.    Comorbidity factors that impact the plan of care are:      High Blood Pressure, Migraines/Headaches, Numbness/Tingling, Sleep Disorder/Apnea .     Medications impacting care: heartburn.  2) Examination of Body Systems comprised of:   Body structures and functions that impact the plan of care:      Hip and Knee.   Activity limitations that impact the plan of care are:      Lifting, Sitting, Squatting/kneeling, Stairs, Standing, Walking, Sleeping and Laying  down.  3) Clinical presentation characteristics are:   Stable/Uncomplicated.  4) Decision-Making    Low complexity using standardized patient assessment instrument and/or measureable assessment of functional outcome.  Cumulative Therapy Evaluation is: Low complexity.    Previous and current functional limitations:  (See Goal Flow Sheet for this information)    Short term and Long term goals: (See Goal Flow Sheet for this information)     Communication ability:  Patient has an  for communication clarity.  Treatment Explanation - The following has been discussed with the patient:   RX ordered/plan of care  Anticipated outcomes  Possible risks and side effects  This patient would benefit from PT intervention to resume normal activities.   Rehab potential is good.    Frequency:  1 X week, once daily  Duration:  for 4 weeks tapering to 2 X a month x1 month  Discharge Plan:  Achieve all LTG.  Independent in home treatment program.  Reach maximal therapeutic benefit.      Please refer to the daily flowsheet for treatment today, total treatment time and time spent performing 1:1 timed codes.

## 2022-03-17 ENCOUNTER — THERAPY VISIT (OUTPATIENT)
Dept: PHYSICAL THERAPY | Facility: CLINIC | Age: 62
End: 2022-03-17
Attending: FAMILY MEDICINE
Payer: COMMERCIAL

## 2022-03-17 DIAGNOSIS — M17.0 PRIMARY OSTEOARTHRITIS OF KNEES, BILATERAL: ICD-10-CM

## 2022-03-17 PROCEDURE — 97110 THERAPEUTIC EXERCISES: CPT | Mod: GP

## 2022-03-17 PROCEDURE — 97161 PT EVAL LOW COMPLEX 20 MIN: CPT | Mod: GP

## 2022-03-17 NOTE — PROGRESS NOTES
[facility]    OUTPATIENT Physical Therapy ORTHOPEDIC EVALUATION  PLAN OF TREATMENT FOR OUTPATIENT REHABILITATION  (COMPLETE FOR INITIAL CLAIMS ONLY)  Patient's Last Name, First Name, M.I.  YOB: 1960  Radha Rocha    Provider s Name:  [facility]   Medical Record No.  [id number]   Start of Care Date:  03/17/22   Onset Date:   03/08/22 (Order date)   Type:     _X__PT   ___OT Medical Diagnosis:    Encounter Diagnosis   Name Primary?     Primary osteoarthritis of knees, bilateral         Treatment Diagnosis:  RUBI knee OA        Goals:     03/17/22 0500   Body Part   Goals listed below are for RUBI knees   Goal #1   Goal #1 stairs   Previous Functional Level Ascend/descend stairs;with a railing   Performance Level Pain-free   Current Functional Level Ascend/descend stairs;one step at a time;with a railing   Performance Level Pain 7/10   STG Target Performance Ascend/descend stairs;one step at a time   Performance Level Pain 2/10   Rationale to enter/leave the house safely;for safe community access to buildings   Due Date 04/16/22   LTG Target Performance Ascend/descend stairs;in a normal reciprocal pattern;with railing   Performance Level Pain-free   Rationale to enter/leave the house safely;for safe community access to buildings   Due Date 05/11/22       Therapy Frequency:  1x/week x 4 weeks  Predicted Duration of Therapy Intervention:  tapering to 2x/month x 1 month    Bessie Lee, PT                 I CERTIFY THE NEED FOR THESE SERVICES FURNISHED UNDER        THIS PLAN OF TREATMENT AND WHILE UNDER MY CARE     (Physician attestation of this document indicates review and certification of the therapy plan).                       Certification Date From:  03/17/22   Certification Date To:  04/27/22    Referring Provider:  Alon Cee    Initial Assessment        See Epic  Evaluation SOC Date: 03/17/22

## 2022-03-30 ENCOUNTER — THERAPY VISIT (OUTPATIENT)
Dept: PHYSICAL THERAPY | Facility: CLINIC | Age: 62
End: 2022-03-30
Attending: FAMILY MEDICINE
Payer: COMMERCIAL

## 2022-03-30 DIAGNOSIS — M17.0 PRIMARY OSTEOARTHRITIS OF KNEES, BILATERAL: Primary | ICD-10-CM

## 2022-03-30 PROCEDURE — 97110 THERAPEUTIC EXERCISES: CPT | Mod: GP | Performed by: PHYSICAL THERAPIST

## 2022-04-26 ENCOUNTER — THERAPY VISIT (OUTPATIENT)
Dept: PHYSICAL THERAPY | Facility: CLINIC | Age: 62
End: 2022-04-26
Payer: COMMERCIAL

## 2022-04-26 DIAGNOSIS — M17.0 PRIMARY OSTEOARTHRITIS OF KNEES, BILATERAL: Primary | ICD-10-CM

## 2022-04-26 PROCEDURE — 97110 THERAPEUTIC EXERCISES: CPT | Mod: GP | Performed by: PHYSICAL THERAPIST

## 2022-04-26 PROCEDURE — 97140 MANUAL THERAPY 1/> REGIONS: CPT | Mod: GP | Performed by: PHYSICAL THERAPIST

## 2022-05-17 ENCOUNTER — OFFICE VISIT (OUTPATIENT)
Dept: ORTHOPEDICS | Facility: CLINIC | Age: 62
End: 2022-05-17
Payer: COMMERCIAL

## 2022-05-17 VITALS — WEIGHT: 185 LBS | HEIGHT: 66 IN | BODY MASS INDEX: 29.73 KG/M2

## 2022-05-17 DIAGNOSIS — M17.0 PRIMARY OSTEOARTHRITIS OF KNEES, BILATERAL: ICD-10-CM

## 2022-05-17 PROCEDURE — 99213 OFFICE O/P EST LOW 20 MIN: CPT | Performed by: FAMILY MEDICINE

## 2022-05-17 RX ORDER — CYCLOBENZAPRINE HCL 5 MG
5 TABLET ORAL
Qty: 30 TABLET | Refills: 3 | Status: SHIPPED | OUTPATIENT
Start: 2022-05-17

## 2022-05-17 RX ORDER — MELOXICAM 15 MG/1
15 TABLET ORAL DAILY
Qty: 30 TABLET | Refills: 3 | Status: SHIPPED | OUTPATIENT
Start: 2022-05-17

## 2022-05-17 NOTE — PROGRESS NOTES
ASSESSMENT/PLAN:    (M17.0) Primary osteoarthritis of knees, bilateral  Comment: reviewed tx options; meds refilled and visco injections ordered; will double-book next week to give her injections   Plan: diclofenac (VOLTAREN) 1 % topical gel,         meloxicam (MOBIC) 15 MG tablet, cyclobenzaprine        (FLEXERIL) 5 MG tablet, PRIOR AUTH REQUEST         ORTHO INJECTION            Alon Cee MD  May 17, 2022  1:24 PM      Pt is a 62 year old female last seen on 3/8/2022 here for follow up of:     Bilateral knee OA - Overall she has had some improvements with PT but still has pain with walking. The Voltaren gel and medication has been helping as well.    Did 3 sessions of PT - on next week; does home exercises daily as well as at adult   Legs feel stronger      Per my last note:     (M17.0) Primary osteoarthritis of knees, bilateral  (primary encounter diagnosis)  Comment: reviewed tx options at length; meds adjusted; will start PT; f/u in 2 months  Plan: Physical Therapy Referral, meloxicam (MOBIC) 15        MG tablet, cyclobenzaprine (FLEXERIL) 5 MG         tablet, diclofenac (VOLTAREN) 1 % topical gel    Past Medical History:   Diagnosis Date     Hypertension       Current Outpatient Medications   Medication Sig Dispense Refill     amLODIPine (NORVASC) 5 MG tablet Take 1 tablet by mouth daily       carboxymethylcellulose (REFRESH PLUS) 0.5 % SOLN ophthalmic solution Apply 1 drop to eye 4 times daily 30 mL 3     cyclobenzaprine (FLEXERIL) 5 MG tablet Take 1 tablet (5 mg) by mouth nightly as needed for muscle spasms or other (knee pain) 30 tablet 3     diclofenac (VOLTAREN) 1 % topical gel Apply 2 g topically 4 times daily 150 g 3     erythromycin (ROMYCIN) ophthalmic ointment Place 0.25 inches into the right eye At Bedtime 3.5 g 1     meloxicam (MOBIC) 15 MG tablet Take 1 tablet (15 mg) by mouth daily Take with food 30 tablet 3     omeprazole (PRILOSEC) 40 MG DR capsule Take 40 mg by mouth daily       "  Allergies   Allergen Reactions     Loratadine Itching     Tizanidine Itching      ROS:   Gen- no fevers/chills   Remainder of ROS negative.     Exam:   Ht 1.676 m (5' 6\")   Wt 83.9 kg (185 lb)   BMI 29.86 kg/m        Bilateral knees:  +crepitus  ROM 0-100  No effusion  +medial and lateral joint line tenderness   "

## 2022-05-17 NOTE — LETTER
Date:May 18, 2022      Patient was self referred, no letter generated. Do not send.        [facility] Health Information

## 2022-05-17 NOTE — LETTER
5/17/2022      RE: Radha Rocha  [address]  [place] [address]     Dear Colleague,    Thank you for referring your patient, Radha Rocha, to the [facility] [place]. Please see a copy of my visit note below.    ASSESSMENT/PLAN:    (M17.0) Primary osteoarthritis of knees, bilateral  Comment: reviewed tx options; meds refilled and visco injections ordered; will double-book next week to give her injections   Plan: diclofenac (VOLTAREN) 1 % topical gel,         meloxicam (MOBIC) 15 MG tablet, cyclobenzaprine        (FLEXERIL) 5 MG tablet, PRIOR AUTH REQUEST         ORTHO INJECTION            Alon Cee MD  May 17, 2022  1:24 PM      Pt is a 62 year old female last seen on 3/8/2022 here for follow up of:     Bilateral knee OA - Overall she has had some improvements with PT but still has pain with walking. The Voltaren gel and medication has been helping as well.    Did 3 sessions of PT - on next week; does home exercises daily as well as at adult   Legs feel stronger      Per my last note:     (M17.0) Primary osteoarthritis of knees, bilateral  (primary encounter diagnosis)  Comment: reviewed tx options at length; meds adjusted; will start PT; f/u in 2 months  Plan: Physical Therapy Referral, meloxicam (MOBIC) 15        MG tablet, cyclobenzaprine (FLEXERIL) 5 MG         tablet, diclofenac (VOLTAREN) 1 % topical gel    Past Medical History:   Diagnosis Date     Hypertension       Current Outpatient Medications   Medication Sig Dispense Refill     amLODIPine (NORVASC) 5 MG tablet Take 1 tablet by mouth daily       carboxymethylcellulose (REFRESH PLUS) 0.5 % SOLN ophthalmic solution Apply 1 drop to eye 4 times daily 30 mL 3     cyclobenzaprine (FLEXERIL) 5 MG tablet Take 1 tablet (5 mg) by mouth nightly as needed for muscle spasms or other (knee pain) 30 tablet 3     diclofenac (VOLTAREN) 1 % topical gel Apply 2 g topically 4 times daily 150 g 3      "erythromycin (ROMYCIN) ophthalmic ointment Place 0.25 inches into the right eye At Bedtime 3.5 g 1     meloxicam (MOBIC) 15 MG tablet Take 1 tablet (15 mg) by mouth daily Take with food 30 tablet 3     omeprazole (PRILOSEC) 40 MG DR capsule Take 40 mg by mouth daily        Allergies   Allergen Reactions     Loratadine Itching     Tizanidine Itching      ROS:   Gen- no fevers/chills   Remainder of ROS negative.     Exam:   Ht 1.676 m (5' 6\")   Wt 83.9 kg (185 lb)   BMI 29.86 kg/m        Bilateral knees:  +crepitus  ROM 0-100  No effusion  +medial and lateral joint line tenderness       Again, thank you for allowing me to participate in the care of your patient.      Sincerely,    Alon Cee MD    "

## 2022-05-24 ENCOUNTER — OFFICE VISIT (OUTPATIENT)
Dept: ORTHOPEDICS | Facility: CLINIC | Age: 62
End: 2022-05-24

## 2022-05-24 VITALS — WEIGHT: 185 LBS | HEIGHT: 66 IN | BODY MASS INDEX: 29.73 KG/M2

## 2022-05-24 DIAGNOSIS — M17.0 PRIMARY OSTEOARTHRITIS OF KNEES, BILATERAL: Primary | ICD-10-CM

## 2022-05-24 PROCEDURE — 20610 DRAIN/INJ JOINT/BURSA W/O US: CPT | Mod: 50 | Performed by: FAMILY MEDICINE

## 2022-05-24 RX ORDER — LIDOCAINE HYDROCHLORIDE 10 MG/ML
5 INJECTION, SOLUTION EPIDURAL; INFILTRATION; INTRACAUDAL; PERINEURAL
Status: SHIPPED | OUTPATIENT
Start: 2022-05-24

## 2022-05-24 RX ADMIN — LIDOCAINE HYDROCHLORIDE 5 ML: 10 INJECTION, SOLUTION EPIDURAL; INFILTRATION; INTRACAUDAL; PERINEURAL at 15:50

## 2022-05-24 NOTE — LETTER
Date:May 25, 2022      Patient was self referred, no letter generated. Do not send.        [facility] Health Information

## 2022-05-24 NOTE — NURSING NOTE
[address] [address]  Dept: [phone]  ______________________________________________________________________________    Patient: Radha Rocha   : 1960   MRN: [id number]   May 24, 2022    INVASIVE PROCEDURE SAFETY CHECKLIST    Date: 22   Procedure: Bilateral knee Synvisc One Injections  Patient Name: Radha Rocha  MRN: [id number]  YOB: 1960    Action: Complete sections as appropriate. Any discrepancy results in a HARD COPY until resolved.     PRE PROCEDURE:  Patient ID verified with 2 identifiers (name and  or MRN): Yes  Procedure and site verified with patient/designee (when able): Yes  Accurate consent documentation in medical record: Yes  H&P (or appropriate assessment) documented in medical record: Yes  H&P must be up to 20 days prior to procedure and updates within 24 hours of procedure as applicable: NA  Relevant diagnostic and radiology test results appropriately labeled and displayed as applicable: Yes  Procedure site(s) marked with provider initials: NA    TIMEOUT:  Time-Out performed immediately prior to starting procedure, including verbal and active participation of all team members addressing the following:Yes  * Correct patient identify  * Confirmed that the correct side and site are marked  * An accurate procedure consent form  * Agreement on the procedure to be done  * Correct patient position  * Relevant images and results are properly labeled and appropriately displayed  * The need to administer antibiotics or fluids for irrigation purposes during the procedure as applicable   * Safety precautions based on patient history or medication use    DURING PROCEDURE: Verification of correct person, site, and procedures any time the responsibility for care of the patient is transferred to another member of the care team.       Prior to injection, verified patient identity using patient's name and date of  birth.  Due to injection administration, patient instructed to remain in clinic for 15 minutes  afterwards, and to report any adverse reaction to me immediately.    Joint injection was performed.      Drug Amount Wasted:  None.  Vial/Syringe: Syringe  Expiration Date:  01/2025      Kiarra Lim, LUCIANA  May 24, 2022

## 2022-05-24 NOTE — LETTER
5/24/2022      RE: Radha Rocha  [address]  [place] [address]     Dear Colleague,    Thank you for referring your patient, Radha Rocha, to the [facility] [place]. Please see a copy of my visit note below.    ASSESSMENT/PLAN:    (M17.0) Primary osteoarthritis of knees, bilateral  (primary encounter diagnosis)  Comment: bilateral synviscone today; precautions/anticipatory guidance given  Plan: hylan (SYNVISC ONE) 48 MG/6ML injection,         lidocaine (PF) (XYLOCAINE) 1 % injection, hylan        (SYNVISC ONE) 48 MG/6ML injection, Large Joint         Injection: bilateral knee          Attestation:  This patient has been seen and evaluated by me, Alon Cee MD with the resident, Dr Rich Polk and the care team. I agree with the findings and plan of care as documented in this note.    Alon Cee MD  May 24, 2022  3:53 PM      Pt is a 62 year old female last seen on 5/17/2022 here for follow up of:     Bilateral knee OA - here for bilateral visco injections  No effusion  R knee worse than L    Per my last note:  (M17.0) Primary osteoarthritis of knees, bilateral  Comment: reviewed tx options; meds refilled and visco injections ordered; will double-book next week to give her injections   Plan: diclofenac (VOLTAREN) 1 % topical gel, meloxicam (MOBIC) 15 MG tablet, cyclobenzaprine        (FLEXERIL) 5 MG tablet, PRIOR AUTH REQUEST ORTHO INJECTION    Past Medical History:   Diagnosis Date     Hypertension       Current Outpatient Medications   Medication Sig Dispense Refill     amLODIPine (NORVASC) 5 MG tablet Take 1 tablet by mouth daily       carboxymethylcellulose (REFRESH PLUS) 0.5 % SOLN ophthalmic solution Apply 1 drop to eye 4 times daily 30 mL 3     cyclobenzaprine (FLEXERIL) 5 MG tablet Take 1 tablet (5 mg) by mouth nightly as needed for muscle spasms or other (knee pain) 30 tablet 3     diclofenac (VOLTAREN) 1 % topical gel Apply 2 g topically 4  "times daily 150 g 3     erythromycin (ROMYCIN) ophthalmic ointment Place 0.25 inches into the right eye At Bedtime 3.5 g 1     meloxicam (MOBIC) 15 MG tablet Take 1 tablet (15 mg) by mouth daily Take with food 30 tablet 3     omeprazole (PRILOSEC) 40 MG DR capsule Take 40 mg by mouth daily        Allergies   Allergen Reactions     Loratadine Itching     Tizanidine Itching      ROS:   Gen- no fevers/chills   Rheum - no morning stiffness   Derm - no rash/ redness   Neuro - no numbness, no tingling   Remainder of ROS negative.     Exam:   Ht 1.676 m (5' 6\")   Wt 83.9 kg (185 lb)   BMI 29.86 kg/m        Bilateral knees:  +crepitus  ROM 0-100  No effusion  +medial and lateral joint line tenderness     Procedure Note:  The pt was verbally consented. The L knee was sterilely prepped in usual fashion. Local anesthesia was achieved with 3-5 cc of 1% lidocaine. 6cc of SynviscOne was injected via lateral approach without complication. Pt tolerated procedure well    The pt was verbally consented. The R knee was sterilely prepped in usual fashion. Local anesthesia was achieved with 3-5 cc of 1% lidocaine. 6cc of SynviscOne was injected via lateral approach without complication. Pt tolerated procedure well      Large Joint Injection: bilateral knee    Date/Time: 5/24/2022 3:50 PM  Performed by: Alon Cee MD  Authorized by: Alon Cee MD     Indications:  Pain and osteoarthritis  Needle Size:  22 G  Needle Size comment:  25g for lido  Guidance: landmark guided    Approach:  Anterolateral  Location:  Knee  Laterality:  Bilateral      Medications (Right):  48 mg hylan 48 MG/6ML; 5 mL lidocaine (PF) 1 %  Medications (Left):  48 mg hylan 48 MG/6ML; 5 mL lidocaine (PF) 1 %  Outcome:  Tolerated well, no immediate complications  Procedure discussed: discussed risks, benefits, and alternatives    Consent Given by:  Patient  Timeout: timeout called immediately prior to procedure              Again, thank you for allowing me to " participate in the care of your patient.      Sincerely,    Alon Cee MD

## 2022-05-24 NOTE — PROGRESS NOTES
ASSESSMENT/PLAN:    (M17.0) Primary osteoarthritis of knees, bilateral  (primary encounter diagnosis)  Comment: bilateral synviscone today; precautions/anticipatory guidance given  Plan: hylan (SYNVISC ONE) 48 MG/6ML injection,         lidocaine (PF) (XYLOCAINE) 1 % injection, hylan        (SYNVISC ONE) 48 MG/6ML injection, Large Joint         Injection: bilateral knee          Attestation:  This patient has been seen and evaluated by me, Alon Cee MD with the resident, Dr Rich Polk and the care team. I agree with the findings and plan of care as documented in this note.    Alon Cee MD  May 24, 2022  3:53 PM      Pt is a 62 year old female last seen on 5/17/2022 here for follow up of:     Bilateral knee OA - here for bilateral visco injections  No effusion  R knee worse than L    Per my last note:  (M17.0) Primary osteoarthritis of knees, bilateral  Comment: reviewed tx options; meds refilled and visco injections ordered; will double-book next week to give her injections   Plan: diclofenac (VOLTAREN) 1 % topical gel, meloxicam (MOBIC) 15 MG tablet, cyclobenzaprine        (FLEXERIL) 5 MG tablet, PRIOR AUTH REQUEST ORTHO INJECTION    Past Medical History:   Diagnosis Date     Hypertension       Current Outpatient Medications   Medication Sig Dispense Refill     amLODIPine (NORVASC) 5 MG tablet Take 1 tablet by mouth daily       carboxymethylcellulose (REFRESH PLUS) 0.5 % SOLN ophthalmic solution Apply 1 drop to eye 4 times daily 30 mL 3     cyclobenzaprine (FLEXERIL) 5 MG tablet Take 1 tablet (5 mg) by mouth nightly as needed for muscle spasms or other (knee pain) 30 tablet 3     diclofenac (VOLTAREN) 1 % topical gel Apply 2 g topically 4 times daily 150 g 3     erythromycin (ROMYCIN) ophthalmic ointment Place 0.25 inches into the right eye At Bedtime 3.5 g 1     meloxicam (MOBIC) 15 MG tablet Take 1 tablet (15 mg) by mouth daily Take with food 30 tablet 3     omeprazole (PRILOSEC) 40 MG DR capsule Take 40 mg  "by mouth daily        Allergies   Allergen Reactions     Loratadine Itching     Tizanidine Itching      ROS:   Gen- no fevers/chills   Rheum - no morning stiffness   Derm - no rash/ redness   Neuro - no numbness, no tingling   Remainder of ROS negative.     Exam:   Ht 1.676 m (5' 6\")   Wt 83.9 kg (185 lb)   BMI 29.86 kg/m        Bilateral knees:  +crepitus  ROM 0-100  No effusion  +medial and lateral joint line tenderness     Procedure Note:  The pt was verbally consented. The L knee was sterilely prepped in usual fashion. Local anesthesia was achieved with 3-5 cc of 1% lidocaine. 6cc of SynviscOne was injected via lateral approach without complication. Pt tolerated procedure well    The pt was verbally consented. The R knee was sterilely prepped in usual fashion. Local anesthesia was achieved with 3-5 cc of 1% lidocaine. 6cc of SynviscOne was injected via lateral approach without complication. Pt tolerated procedure well      Large Joint Injection: bilateral knee    Date/Time: 5/24/2022 3:50 PM  Performed by: Alon Cee MD  Authorized by: Alon Cee MD     Indications:  Pain and osteoarthritis  Needle Size:  22 G  Needle Size comment:  25g for lido  Guidance: landmark guided    Approach:  Anterolateral  Location:  Knee  Laterality:  Bilateral      Medications (Right):  48 mg hylan 48 MG/6ML; 5 mL lidocaine (PF) 1 %  Medications (Left):  48 mg hylan 48 MG/6ML; 5 mL lidocaine (PF) 1 %  Outcome:  Tolerated well, no immediate complications  Procedure discussed: discussed risks, benefits, and alternatives    Consent Given by:  Patient  Timeout: timeout called immediately prior to procedure          "

## 2022-12-07 ENCOUNTER — MEDICAL CORRESPONDENCE (OUTPATIENT)
Dept: HEALTH INFORMATION MANAGEMENT | Facility: CLINIC | Age: 62
End: 2022-12-07

## 2022-12-08 ENCOUNTER — MEDICAL CORRESPONDENCE (OUTPATIENT)
Dept: HEALTH INFORMATION MANAGEMENT | Facility: CLINIC | Age: 62
End: 2022-12-08

## 2022-12-12 NOTE — TELEPHONE ENCOUNTER
DIAGNOSIS: (R) shoulder pain / Les Moore NP @ [facility] / no recent imaging or surgery   APPOINTMENT DATE: 12.19.22   NOTES STATUS DETAILS   OFFICE NOTE from other specialist Internal Internal:  11.19.19 Javier Elise DO [facility]:  12.7.22 Les Moore  10.5.22 Annabelle Briones MD  1.7.20 4.16.19       MEDICATION LIST Internal    XRAYS (IMAGES & REPORTS) Internal 11.19.19 R shoulder

## 2022-12-13 ENCOUNTER — TRANSCRIBE ORDERS (OUTPATIENT)
Dept: OTHER | Age: 62
End: 2022-12-13

## 2022-12-13 DIAGNOSIS — M25.511 RIGHT SHOULDER PAIN: Primary | ICD-10-CM

## 2022-12-13 DIAGNOSIS — G89.29 CHRONIC RIGHT SHOULDER PAIN: Primary | ICD-10-CM

## 2022-12-13 DIAGNOSIS — M25.511 CHRONIC RIGHT SHOULDER PAIN: Primary | ICD-10-CM

## 2022-12-19 ENCOUNTER — OFFICE VISIT (OUTPATIENT)
Dept: ORTHOPEDICS | Facility: CLINIC | Age: 62
End: 2022-12-19
Payer: COMMERCIAL

## 2022-12-19 ENCOUNTER — PRE VISIT (OUTPATIENT)
Dept: ORTHOPEDICS | Facility: CLINIC | Age: 62
End: 2022-12-19

## 2022-12-19 ENCOUNTER — ANCILLARY PROCEDURE (OUTPATIENT)
Dept: GENERAL RADIOLOGY | Facility: CLINIC | Age: 62
End: 2022-12-19
Attending: FAMILY MEDICINE
Payer: COMMERCIAL

## 2022-12-19 DIAGNOSIS — M25.511 RIGHT SHOULDER PAIN: ICD-10-CM

## 2022-12-19 DIAGNOSIS — M75.101 ROTATOR CUFF SYNDROME OF RIGHT SHOULDER: ICD-10-CM

## 2022-12-19 DIAGNOSIS — M75.41 SHOULDER IMPINGEMENT SYNDROME, RIGHT: Primary | ICD-10-CM

## 2022-12-19 PROCEDURE — 20610 DRAIN/INJ JOINT/BURSA W/O US: CPT | Mod: RT | Performed by: FAMILY MEDICINE

## 2022-12-19 PROCEDURE — 73030 X-RAY EXAM OF SHOULDER: CPT | Mod: RT | Performed by: RADIOLOGY

## 2022-12-19 PROCEDURE — 99213 OFFICE O/P EST LOW 20 MIN: CPT | Mod: 25 | Performed by: FAMILY MEDICINE

## 2022-12-19 RX ORDER — LIDOCAINE HYDROCHLORIDE 10 MG/ML
4 INJECTION, SOLUTION EPIDURAL; INFILTRATION; INTRACAUDAL; PERINEURAL
Status: SHIPPED | OUTPATIENT
Start: 2022-12-19

## 2022-12-19 RX ORDER — TRIAMCINOLONE ACETONIDE 40 MG/ML
40 INJECTION, SUSPENSION INTRA-ARTICULAR; INTRAMUSCULAR
Status: SHIPPED | OUTPATIENT
Start: 2022-12-19

## 2022-12-19 RX ADMIN — LIDOCAINE HYDROCHLORIDE 4 ML: 10 INJECTION, SOLUTION EPIDURAL; INFILTRATION; INTRACAUDAL; PERINEURAL at 13:27

## 2022-12-19 RX ADMIN — TRIAMCINOLONE ACETONIDE 40 MG: 40 INJECTION, SUSPENSION INTRA-ARTICULAR; INTRAMUSCULAR at 13:27

## 2022-12-19 NOTE — NURSING NOTE
[address] [address]  Dept: [phone]  ______________________________________________________________________________    Patient: Radha Rocha   : 1960   MRN: [id number]   2022    INVASIVE PROCEDURE SAFETY CHECKLIST    Date: 22   Procedure: Right subacromial CSI  Patient Name: Radha Rocha  MRN: [id number]  YOB: 1960    Action: Complete sections as appropriate. Any discrepancy results in a HARD COPY until resolved.     PRE PROCEDURE:  Patient ID verified with 2 identifiers (name and  or MRN): Yes  Procedure and site verified with patient/designee (when able): Yes  Accurate consent documentation in medical record: Yes  H&P (or appropriate assessment) documented in medical record: Yes  H&P must be up to 20 days prior to procedure and updates within 24 hours of procedure as applicable: NA  Relevant diagnostic and radiology test results appropriately labeled and displayed as applicable: Yes  Procedure site(s) marked with provider initials: NA    TIMEOUT:  Time-Out performed immediately prior to starting procedure, including verbal and active participation of all team members addressing the following:Yes  * Correct patient identify  * Confirmed that the correct side and site are marked  * An accurate procedure consent form  * Agreement on the procedure to be done  * Correct patient position  * Relevant images and results are properly labeled and appropriately displayed  * The need to administer antibiotics or fluids for irrigation purposes during the procedure as applicable   * Safety precautions based on patient history or medication use    DURING PROCEDURE: Verification of correct person, site, and procedures any time the responsibility for care of the patient is transferred to another member of the care team.       Prior to injection, verified patient identity using patient's name and date of birth.  Due to  injection administration, patient instructed to remain in clinic for 15 minutes  afterwards, and to report any adverse reaction to me immediately.    Bursa injection was performed.      Drug Amount Wasted:  None.  Vial/Syringe: Single dose vial  Expiration Date:  8/1/24      Rj Wright, ATC  December 19, 2022

## 2022-12-19 NOTE — LETTER
12/19/2022      RE: Radha Rocha  [address]  [place] [address]     Dear Colleague,    Thank you for referring your patient, Radha Rocha, to the [facility] [place]. Please see a copy of my visit note below.    Sports Medicine Clinic Visit    PCP: No Ref-Primary, Physician    Radha Rocha is a 62 year old female who is seen  in consultation at the request of Dr. Moore presenting with recurrent right shoulder impingement pain    Injury: No injury    Location of Pain: right shoulder  Duration of Pain: 2 years  Rating of Pain: 10/10  Pain is better with: Nothing  Pain is worse with: Reaching, showering, lifting things  Additional Features: pain  Treatment so far consists of: massage, tylenol  Prior History of related problems: none    There were no vitals taken for this visit.    Patient saw her primary provider 12/7/22 for right-sided shoulder impingement pain.  Right-sided shoulder pain with overhead reaching or an outstretched arm over the last 2 months.  Hard to lay on the right side.  Worse with carrying heavy objects .  No recent injury.  No radicular symptoms into the arm or numbness and tingling.  Patient was prescribed naproxen at that visit to take twice daily with meals for 7 days.    Patient was seen by Dr. Javier Elise in 2019 for right-sided shoulder pain that was felt to be due to rotator cuff tendinopathy.  At that visit she was offered an oral nonsteroidal anti-inflammatory and physical therapy.  She declined physical therapy at that time.  X-ray of the right shoulder 2019 no significant degenerative change.    Patient was prescribed Mobic and topical Voltaren for knee arthritis 5/24/2022          4 views right shoulder radiographs 11/20/2019 10:21 AM     History: Blaire views; Acute pain of right shoulder      Comparison: None     Findings:     AP, Grashey, transscapular Y, axillary  views of the right shoulder  were obtained.      No acute  osseous abnormality. Glenohumeral and acromioclavicular  joints are congruent.     Mild degenerative changes of the acromioclavicular joint. No  substantial degenerative change of the glenohumeral joint.     Soft tissue is unremarkable. The visualized lung is clear.                                                                      Impression:  1. No acute osseous abnormality.  2. No substantial degenerative change.     PAUL CASTRO MD (Joe)    PMH:  Past Medical History:   Diagnosis Date     Hypertension        Active problem list:  Patient Active Problem List   Diagnosis   (none) - all problems resolved or deleted       FH:  No family history on file.    SH:  Social History     Socioeconomic History     Marital status:      Spouse name: Not on file     Number of children: Not on file     Years of education: Not on file     Highest education level: Not on file   Occupational History     Not on file   Tobacco Use     Smoking status: Never     Smokeless tobacco: Never   Substance and Sexual Activity     Alcohol use: No     Drug use: No     Sexual activity: Yes     Partners: Male   Other Topics Concern     Parent/sibling w/ CABG, MI or angioplasty before 65F 55M? No   Social History Narrative     Not on file     Social Determinants of Health     Financial Resource Strain: Not on file   Food Insecurity: Not on file   Transportation Needs: Not on file   Physical Activity: Not on file   Stress: Not on file   Social Connections: Not on file   Intimate Partner Violence: Not on file   Housing Stability: Not on file       MEDS:  See EMR, reviewed  ALL:  See EMR, reviewed    REVIEW OF SYSTEMS:  CONSTITUTIONAL:NEGATIVE for fever, chills, change in weight  INTEGUMENTARY/SKIN: NEGATIVE for worrisome rashes, moles or lesions  EYES: NEGATIVE for vision changes or irritation  ENT/MOUTH: NEGATIVE for ear, mouth and throat problems  RESP:NEGATIVE for significant cough or SOB  BREAST: NEGATIVE for masses, tenderness  or discharge  CV: NEGATIVE for chest pain, palpitations or peripheral edema  GI: NEGATIVE for nausea, abdominal pain, heartburn, or change in bowel habits  :NEGATIVE for frequency, dysuria, or hematuria  :NEGATIVE for frequency, dysuria, or hematuria  NEURO: NEGATIVE for weakness, dizziness or paresthesias  ENDOCRINE: NEGATIVE for temperature intolerance, skin/hair changes  HEME/ALLERGY/IMMUNE: NEGATIVE for bleeding problems  PSYCHIATRIC: NEGATIVE for changes in mood or affect        Palpation:    Tender:  Ant cuff  Nontender:  SC joint, clavicle, AC joint, acromion,  proximal bicep tendon    Range of Motion: in supine position,full passive ROM, no frozen shoulder        Active:all normal        Passive: all normal    Strength: rotator cuff--deltoid strength full; supraspinatus strength full; infraspinatus strength full; subscapularis strength full    Special tests: Pos Neer's test, Pos Madrid, Negative Sulcus Sign, Negative Broderick's    Scapular symmetry:  Improvements to be made in symmetry of stabilized movement.  No scapular winging.    Head-fwd, shoulder-fwd posture:  Positive tendency     Distal pulses intact.  Sensation intact.  Skin intact.        4 views right shoulder radiographs 12/19/2022 2:07 PM     History: Right shoulder pain     Comparison: 11/19/2019     Findings:     AP, Grashey, transscapular Y, axillary  views of the right shoulder  were obtained.      No acute osseous abnormality. Glenohumeral and acromioclavicular  joints are congruent.     Mild-to-moderate degenerative changes of the acromioclavicular joint.  No substantial degenerative change of the glenohumeral joint.     Soft tissue is unremarkable. The visualized lung is clear.     Bones appear osteopenic.                                                                      Impression:  1. No acute osseous abnormality.  2. Mild to moderate acromioclavicular joint degenerative change,  similar to prior.     ANIBAL BARRIENTOS           I  personally reviewed with the patient x-rays of the shoulder that show mild AC joint DJD, unchanged from previous x-rays.    Assessment: Right shoulder impingement syndrome, suspect rotator cuff tendinitis.  Mild AC joint DJD.    Plan: She would like to try a subacromial injection and I explained the importance of physical therapy.  She will follow-up with physical therapy after the injection.    After informed consent about bleeding, infection steroid flare, after prepping with surgical scrub she was injected the right subacromial space from a posterior approach with a combination of 1 cc of Kenalog 40 and 4 cc of 1% lidocaine, with the last cc placed over the tender anterior cuff.  She tolerated this without issue.  She moved her shoulder through full range of motion.  She will follow-up with physical therapy.      Large Joint Injection: R subacromial bursa    Date/Time: 12/19/2022 1:27 PM  Performed by: Linwood Yeung MD  Authorized by: Linwood Yeung MD     Indications:  Pain  Needle Size:  25 G  Guidance: landmark guided    Approach:  Posterolateral  Location:  Shoulder      Site:  R subacromial bursa  Medications:  40 mg triamcinolone 40 MG/ML; 4 mL lidocaine (PF) 1 %  Outcome:  Tolerated well, no immediate complications  Procedure discussed: discussed risks, benefits, and alternatives    Consent Given by:  Patient  Timeout: timeout called immediately prior to procedure    Prep: patient was prepped and draped in usual sterile fashion     Rj Wright ATC on 12/19/2022 at 1:28 PM        Again, thank you for allowing me to participate in the care of your patient.      Sincerely,    Linwood Yeung MD

## 2022-12-19 NOTE — PROGRESS NOTES
Sports Medicine Clinic Visit    PCP: No Ref-Primary, Physician    Radha MALDONADO Aj is a 62 year old female who is seen  in consultation at the request of Dr. Moore presenting with recurrent right shoulder impingement pain    Injury: No injury    Location of Pain: right shoulder  Duration of Pain: 2 years  Rating of Pain: 10/10  Pain is better with: Nothing  Pain is worse with: Reaching, showering, lifting things  Additional Features: pain  Treatment so far consists of: massage, tylenol  Prior History of related problems: none    There were no vitals taken for this visit.    Patient saw her primary provider 12/7/22 for right-sided shoulder impingement pain.  Right-sided shoulder pain with overhead reaching or an outstretched arm over the last 2 months.  Hard to lay on the right side.  Worse with carrying heavy objects .  No recent injury.  No radicular symptoms into the arm or numbness and tingling.  Patient was prescribed naproxen at that visit to take twice daily with meals for 7 days.    Patient was seen by Dr. Javier Elise in 2019 for right-sided shoulder pain that was felt to be due to rotator cuff tendinopathy.  At that visit she was offered an oral nonsteroidal anti-inflammatory and physical therapy.  She declined physical therapy at that time.  X-ray of the right shoulder 2019 no significant degenerative change.    Patient was prescribed Mobic and topical Voltaren for knee arthritis 5/24/2022          4 views right shoulder radiographs 11/20/2019 10:21 AM     History: [place]bster views; Acute pain of right shoulder      Comparison: None     Findings:     AP, Grashey, transscapular Y, axillary  views of the right shoulder  were obtained.      No acute osseous abnormality. Glenohumeral and acromioclavicular  joints are congruent.     Mild degenerative changes of the acromioclavicular joint. No  substantial degenerative change of the glenohumeral joint.     Soft tissue is unremarkable. The visualized lung is clear.                                                                       Impression:  1. No acute osseous abnormality.  2. No substantial degenerative change.     PAUL CASTRO MD (Joe)    PMH:  Past Medical History:   Diagnosis Date     Hypertension        Active problem list:  Patient Active Problem List   Diagnosis   (none) - all problems resolved or deleted       FH:  No family history on file.    SH:  Social History     Socioeconomic History     Marital status:      Spouse name: Not on file     Number of children: Not on file     Years of education: Not on file     Highest education level: Not on file   Occupational History     Not on file   Tobacco Use     Smoking status: Never     Smokeless tobacco: Never   Substance and Sexual Activity     Alcohol use: No     Drug use: No     Sexual activity: Yes     Partners: Male   Other Topics Concern     Parent/sibling w/ CABG, MI or angioplasty before 65F 55M? No   Social History Narrative     Not on file     Social Determinants of Health     Financial Resource Strain: Not on file   Food Insecurity: Not on file   Transportation Needs: Not on file   Physical Activity: Not on file   Stress: Not on file   Social Connections: Not on file   Intimate Partner Violence: Not on file   Housing Stability: Not on file       MEDS:  See EMR, reviewed  ALL:  See EMR, reviewed    REVIEW OF SYSTEMS:  CONSTITUTIONAL:NEGATIVE for fever, chills, change in weight  INTEGUMENTARY/SKIN: NEGATIVE for worrisome rashes, moles or lesions  EYES: NEGATIVE for vision changes or irritation  ENT/MOUTH: NEGATIVE for ear, mouth and throat problems  RESP:NEGATIVE for significant cough or SOB  BREAST: NEGATIVE for masses, tenderness or discharge  CV: NEGATIVE for chest pain, palpitations or peripheral edema  GI: NEGATIVE for nausea, abdominal pain, heartburn, or change in bowel habits  :NEGATIVE for frequency, dysuria, or hematuria  :NEGATIVE for frequency, dysuria, or hematuria  NEURO: NEGATIVE  for weakness, dizziness or paresthesias  ENDOCRINE: NEGATIVE for temperature intolerance, skin/hair changes  HEME/ALLERGY/IMMUNE: NEGATIVE for bleeding problems  PSYCHIATRIC: NEGATIVE for changes in mood or affect        Palpation:    Tender:  Ant cuff  Nontender:  SC joint, clavicle, AC joint, acromion,  proximal bicep tendon    Range of Motion: in supine position,full passive ROM, no frozen shoulder        Active:all normal        Passive: all normal    Strength: rotator cuff--deltoid strength full; supraspinatus strength full; infraspinatus strength full; subscapularis strength full    Special tests: Pos Neer's test, Pos Madrid, Negative Sulcus Sign, Negative Broderick's    Scapular symmetry:  Improvements to be made in symmetry of stabilized movement.  No scapular winging.    Head-fwd, shoulder-fwd posture:  Positive tendency     Distal pulses intact.  Sensation intact.  Skin intact.        4 views right shoulder radiographs 12/19/2022 2:07 PM     History: Right shoulder pain     Comparison: 11/19/2019     Findings:     AP, Grashey, transscapular Y, axillary  views of the right shoulder  were obtained.      No acute osseous abnormality. Glenohumeral and acromioclavicular  joints are congruent.     Mild-to-moderate degenerative changes of the acromioclavicular joint.  No substantial degenerative change of the glenohumeral joint.     Soft tissue is unremarkable. The visualized lung is clear.     Bones appear osteopenic.                                                                      Impression:  1. No acute osseous abnormality.  2. Mild to moderate acromioclavicular joint degenerative change,  similar to prior.     ANIBAL BARRIENTOS I personally reviewed with the patient x-rays of the shoulder that show mild AC joint DJD, unchanged from previous x-rays.    Assessment: Right shoulder impingement syndrome, suspect rotator cuff tendinitis.  Mild AC joint DJD.    Plan: She would like to try a subacromial  injection and I explained the importance of physical therapy.  She will follow-up with physical therapy after the injection.    After informed consent about bleeding, infection steroid flare, after prepping with surgical scrub she was injected the right subacromial space from a posterior approach with a combination of 1 cc of Kenalog 40 and 4 cc of 1% lidocaine, with the last cc placed over the tender anterior cuff.  She tolerated this without issue.  She moved her shoulder through full range of motion.  She will follow-up with physical therapy.      Large Joint Injection: R subacromial bursa    Date/Time: 12/19/2022 1:27 PM  Performed by: Linwood Yeung MD  Authorized by: Linwood Yeung MD     Indications:  Pain  Needle Size:  25 G  Guidance: landmark guided    Approach:  Posterolateral  Location:  Shoulder      Site:  R subacromial bursa  Medications:  40 mg triamcinolone 40 MG/ML; 4 mL lidocaine (PF) 1 %  Outcome:  Tolerated well, no immediate complications  Procedure discussed: discussed risks, benefits, and alternatives    Consent Given by:  Patient  Timeout: timeout called immediately prior to procedure    Prep: patient was prepped and draped in usual sterile fashion     Rj Wright ATC on 12/19/2022 at 1:28 PM

## 2024-06-10 ENCOUNTER — APPOINTMENT (OUTPATIENT)
Dept: GENERAL RADIOLOGY | Facility: CLINIC | Age: 64
End: 2024-06-10
Attending: INTERNAL MEDICINE
Payer: COMMERCIAL

## 2024-06-10 ENCOUNTER — TELEPHONE (OUTPATIENT)
Dept: ORTHOPEDICS | Facility: CLINIC | Age: 64
End: 2024-06-10
Payer: COMMERCIAL

## 2024-06-10 ENCOUNTER — HOSPITAL ENCOUNTER (EMERGENCY)
Facility: CLINIC | Age: 64
Discharge: HOME | End: 2024-06-10
Attending: INTERNAL MEDICINE | Admitting: INTERNAL MEDICINE
Payer: COMMERCIAL

## 2024-06-10 VITALS
HEART RATE: 84 BPM | TEMPERATURE: 97.8 F | RESPIRATION RATE: 16 BRPM | SYSTOLIC BLOOD PRESSURE: 152 MMHG | DIASTOLIC BLOOD PRESSURE: 81 MMHG | OXYGEN SATURATION: 100 %

## 2024-06-10 DIAGNOSIS — S82.852A TRIMALLEOLAR FRACTURE OF ANKLE, CLOSED, LEFT, INITIAL ENCOUNTER: ICD-10-CM

## 2024-06-10 PROCEDURE — 999N000065 XR ANKLE LEFT G/E 3 VIEWS

## 2024-06-10 PROCEDURE — 73590 X-RAY EXAM OF LOWER LEG: CPT | Mod: LT

## 2024-06-10 PROCEDURE — 99284 EMERGENCY DEPT VISIT MOD MDM: CPT | Mod: 25 | Performed by: INTERNAL MEDICINE

## 2024-06-10 PROCEDURE — 250N000013 HC RX MED GY IP 250 OP 250 PS 637: Performed by: INTERNAL MEDICINE

## 2024-06-10 PROCEDURE — 73610 X-RAY EXAM OF ANKLE: CPT | Mod: LT

## 2024-06-10 PROCEDURE — 27818 TREATMENT OF ANKLE FRACTURE: CPT | Mod: LT | Performed by: INTERNAL MEDICINE

## 2024-06-10 RX ORDER — IBUPROFEN 600 MG/1
600 TABLET, FILM COATED ORAL ONCE
Status: COMPLETED | OUTPATIENT
Start: 2024-06-10 | End: 2024-06-10

## 2024-06-10 RX ORDER — OXYCODONE HYDROCHLORIDE 5 MG/1
5 TABLET ORAL EVERY 6 HOURS PRN
Qty: 12 TABLET | Refills: 0 | Status: SHIPPED | OUTPATIENT
Start: 2024-06-10 | End: 2024-06-13

## 2024-06-10 RX ORDER — OXYCODONE HYDROCHLORIDE 5 MG/1
5 TABLET ORAL ONCE
Status: COMPLETED | OUTPATIENT
Start: 2024-06-10 | End: 2024-06-10

## 2024-06-10 RX ADMIN — IBUPROFEN 600 MG: 600 TABLET, FILM COATED ORAL at 11:31

## 2024-06-10 RX ADMIN — OXYCODONE HYDROCHLORIDE 5 MG: 5 TABLET ORAL at 12:29

## 2024-06-10 RX ADMIN — OXYCODONE HYDROCHLORIDE 5 MG: 5 TABLET ORAL at 13:51

## 2024-06-10 ASSESSMENT — ACTIVITIES OF DAILY LIVING (ADL)
ADLS_ACUITY_SCORE: 35
ADLS_ACUITY_SCORE: 36
ADLS_ACUITY_SCORE: 35
ADLS_ACUITY_SCORE: 36

## 2024-06-10 ASSESSMENT — COLUMBIA-SUICIDE SEVERITY RATING SCALE - C-SSRS
6. HAVE YOU EVER DONE ANYTHING, STARTED TO DO ANYTHING, OR PREPARED TO DO ANYTHING TO END YOUR LIFE?: NO
1. IN THE PAST MONTH, HAVE YOU WISHED YOU WERE DEAD OR WISHED YOU COULD GO TO SLEEP AND NOT WAKE UP?: NO
2. HAVE YOU ACTUALLY HAD ANY THOUGHTS OF KILLING YOURSELF IN THE PAST MONTH?: NO

## 2024-06-10 NOTE — ED TRIAGE NOTES
Triage Assessment (Adult)       Row Name 06/10/24 1012          Triage Assessment    Airway WDL WDL        Respiratory WDL    Respiratory WDL WDL        Skin Circulation/Temperature WDL    Skin Circulation/Temperature WDL WDL        Cardiac WDL    Cardiac WDL WDL        Peripheral/Neurovascular WDL    Peripheral Neurovascular WDL WDL        Cognitive/Neuro/Behavioral WDL    Cognitive/Neuro/Behavioral WDL WDL

## 2024-06-10 NOTE — DISCHARGE INSTRUCTIONS
Letter placed, pls fax to bariatric office. Please make an appointment to follow up with Orthopedics Clinic (phone: [phone]) in 3-7 days even if entirely better.

## 2024-06-10 NOTE — PLAN OF CARE
Orthopaedic Surgery ED Phone Consult      DATE OF SERVICE:  6/10/2024    Orthopedic Surgery was contacted to provide curbside consultation and weigh in on the care Radha (patient) received in the ED for L ankle fracture.     I was called by Dr. Esquivel on 6/10/24 regarding Radha (patient) and was provided with the following information.  Patient had a ground-level fall today where she twisted her left ankle. Per the ED staff, the clinical exam is closed and NVI.       Based on the limited information provided, I recommended the followin year old female with left trimalleolar ankle fracture.      - Patient can be closed reduced and placed into a short leg splint for immobilization.  - NWB LLE  - Pain control per ED  - Xrays/imaging: obtain postreduction films and page ortho when complete  - Dispo: patient can discharge home pending adequate postreduction films  - Follow-up: patient will be contacted by orthopedic schedulers to establish clinic visit within the week.     Dr. Esquivel did not request that I personally see or examine the patient at this time.      My recommendations are not intended to take the place of the Dr. Esquivel's clinical judgment, which should always be utilized to provide the most appropriate care to meet the unique needs of each patient.     Orthopedic Surgery did not see nor was involved with the physical examination of the patient for this encounter.     --  Gemma Crabtree MD  Orthopedic Surgery PGY-1

## 2024-06-10 NOTE — TELEPHONE ENCOUNTER
Orthopedic/Sports Medicine Fracture Triage    Incoming call/or message from orders pager.    Fracture type: Ankle.    The patient is in a  cast.    Date of injury 6/10/24.    Triaged by: Dr. Carmona .    Determined to be managed Surgically.    Needs to be seen within 1 week.    Additional Comments/information: Melissa Espino LPN      Writer called with  service and left voice message for pt to schedule apt with Dr. Carmona on 6/14/24 in a [facility] spot, for Trimalleolar fx left.     Lorelei Espino LPN

## 2024-06-10 NOTE — ED PROVIDER NOTES
ED Provider Note  [facility]      History     Chief Complaint   Patient presents with    Leg Injury     Left leg pain on ankle after fall.  Fell thirty minutes ago.       HPI  Radha B Aj is a 64 year old female with a history of mixed HLD, HTN, osteoporosis, and arthritis of knee presents to the emergency department due to leg pain.    History given by her guest at bedside.  Patient fell down and hurt her left ankle.  Patient currently has the most pain around left ankle.    Past Medical History  Past Medical History:   Diagnosis Date    Hypertension      Past Surgical History:   Procedure Laterality Date    EYE SURGERY      Right eye removal     oxyCODONE (ROXICODONE) 5 MG tablet  amLODIPine (NORVASC) 5 MG tablet  carboxymethylcellulose (REFRESH PLUS) 0.5 % SOLN ophthalmic solution  cyclobenzaprine (FLEXERIL) 5 MG tablet  diclofenac (VOLTAREN) 1 % topical gel  erythromycin (ROMYCIN) ophthalmic ointment  meloxicam (MOBIC) 15 MG tablet  omeprazole (PRILOSEC) 40 MG DR capsule      Allergies   Allergen Reactions    Loratadine Itching    Tizanidine Itching     Family History  No family history on file.  Social History   Social History     Tobacco Use    Smoking status: Never    Smokeless tobacco: Never   Substance Use Topics    Alcohol use: No    Drug use: No      A medically appropriate review of systems was performed with pertinent positives and negatives noted in the HPI, and all other systems negative.    Physical Exam   BP: (!) 152/81  Pulse: 84  Temp: 97.8  F (36.6  C)  Resp: 16  SpO2: 100 %  Physical Exam  Vitals and nursing note reviewed.   Constitutional:       General: She is not in acute distress.     Appearance: She is not diaphoretic.   HENT:      Head: Normocephalic and atraumatic.   Eyes:      Extraocular Movements: Extraocular movements intact.      Conjunctiva/sclera: Conjunctivae normal.      Pupils: Pupils are equal, round, and reactive to light.   Cardiovascular:       Rate and Rhythm: Normal rate and regular rhythm.      Heart sounds: Normal heart sounds.   Pulmonary:      Effort: Pulmonary effort is normal. No respiratory distress.      Breath sounds: Normal breath sounds.   Chest:      Chest wall: No tenderness.   Abdominal:      Palpations: Abdomen is soft.      Tenderness: There is no abdominal tenderness.   Musculoskeletal:      Cervical back: Normal range of motion and neck supple. No tenderness.      Thoracic back: No tenderness.      Lumbar back: No tenderness.      Right ankle: Normal.      Left ankle: Swelling present. No deformity, ecchymosis or lacerations. Tenderness present over the lateral malleolus and medial malleolus. Decreased range of motion. Anterior drawer test negative. Normal pulse.      Left Achilles Tendon: Normal.        Legs:    Skin:     General: Skin is warm.      Findings: No rash.   Neurological:      Mental Status: She is oriented to person, place, and time.           ED Course, Procedures, & Data      [facility]    -Fracture    Date/Time: 6/10/2024 4:23 PM    Performed by: Ellie Esquivel MD  Authorized by: Ellie Esquivel MD    Risks, benefits and alternatives discussed.      INJURY      Injury location:  Ankle    Ankle injury location:  L ankle    Ankle fracture type: trimalleolar      PRE PROCEDURE ASSESSMENT      Neurological function: normal      Distal perfusion: normal      Range of motion: reduced      ANESTHESIA (see MAR for exact dosages)      Anesthesia method:  None    PROCEDURE DETAILS:     Manipulation performed: yes      Skin traction used: yes      Skeletal traction used: yes      Reduction successful: yes      X-ray confirmed reduction: yes      Immobilization:  Splint    Splint type:  Ankle stirrup    Supplies used:  Plaster    POST PROCEDURE ASSESSMENT      Neurological function: normal      Distal perfusion: normal      Range of motion: improved      Patient will be referred for a  decision regarding definitive fracture treatment (non-operative vs operative).    PROCEDURE    Patient Tolerance:  Patient tolerated the procedure well with no immediate complications  [facility]    -Dislocation - Lower Extremity    Date/Time: 6/10/2024 4:28 PM    Performed by: Ellie Esquivel MD  Authorized by: Ellie Esquivel MD    Risks, benefits and alternatives discussed.      LOCATION     Location:  Ankle    Ankle injury location:  L ankle    PRE PROCEDURE DETAILS:     Distal perfusion: normal      Range of motion: normal      ANESTHESIA (see MAR for exact dosages)      Anesthesia method:  None    PROCEDURE DETAILS      Manipulation performed: yes      Ankle reduction method:  Traction and counter traction    Reduction successful: yes      Reduction confirmed with imaging: yes      Immobilization:  Splint    Splint type:  Ankle stirrup    Supplies used:  Plaster    POST PROCEDURE DETAILS      Neurological function: normal      Distal perfusion: normal      Range of motion: improved        PROCEDURE    Patient Tolerance:  Patient tolerated the procedure well with no immediate complications              Results for orders placed or performed during the hospital encounter of 06/10/24   XR Tibia and Fibula Left 2 Views     Status: None    Narrative    XR TIBIA AND FIBULA LEFT 2 VIEWS, XR ANKLE LEFT G/E 3 VIEWS   6/10/2024 11:28 AM     HISTORY: fall pain  COMPARISON: None.       Impression    IMPRESSION: There are acute appearing, moderately displaced fractures  of the left medial malleolus and distal fibula with extension to the  ankle. There is lateral subluxation of the talus relative to the  distal tibia. There is also a mildly displaced posterior malleolus  fracture. No acute fracture is seen in the more proximal tibia or  fibula.    MANDA WASHBURN MD         SYSTEM ID:  [id number]   XR Ankle Left G/E 3 Views     Status: None    Narrative    XR TIBIA AND FIBULA  LEFT 2 VIEWS, XR ANKLE LEFT G/E 3 VIEWS   6/10/2024 11:28 AM     HISTORY: fall pain  COMPARISON: None.       Impression    IMPRESSION: There are acute appearing, moderately displaced fractures  of the left medial malleolus and distal fibula with extension to the  ankle. There is lateral subluxation of the talus relative to the  distal tibia. There is also a mildly displaced posterior malleolus  fracture. No acute fracture is seen in the more proximal tibia or  fibula.    MANDA WASHBURN MD         SYSTEM ID:  [id number]   XR Ankle Left G/E 3 Views     Status: None    Narrative    LEFT ANKLE THREE OR MORE VIEWS   6/10/2024 2:08 PM     HISTORY:  Post reduction splint.    COMPARISON: Radiographs from earlier today.      Impression    IMPRESSION: Interval closed reduction and splint placement. Position  and alignment of the fractures is now near anatomic and significantly  improved. Bone detail obscured by the splint material. Otherwise  negative.    EARL HO MD         SYSTEM ID:  [id number]     Medications   ibuprofen (ADVIL/MOTRIN) tablet 600 mg (600 mg Oral $Given 6/10/24 1131)   oxyCODONE (ROXICODONE) tablet 5 mg (5 mg Oral $Given 6/10/24 1229)   oxyCODONE (ROXICODONE) tablet 5 mg (5 mg Oral $Given 6/10/24 1351)     Labs Ordered and Resulted from Time of ED Arrival to Time of ED Departure - No data to display  XR Ankle Left G/E 3 Views   Final Result   IMPRESSION: Interval closed reduction and splint placement. Position   and alignment of the fractures is now near anatomic and significantly   improved. Bone detail obscured by the splint material. Otherwise   negative.      EARL HO MD            SYSTEM ID:  [id number]      XR Ankle Left G/E 3 Views   Final Result   IMPRESSION: There are acute appearing, moderately displaced fractures   of the left medial malleolus and distal fibula with extension to the   ankle. There is lateral subluxation of the talus relative to the   distal tibia. There is also  a mildly displaced posterior malleolus   fracture. No acute fracture is seen in the more proximal tibia or   fibula.      MANDA WASHBURN MD            SYSTEM ID:  [id number]      XR Tibia and Fibula Left 2 Views   Final Result   IMPRESSION: There are acute appearing, moderately displaced fractures   of the left medial malleolus and distal fibula with extension to the   ankle. There is lateral subluxation of the talus relative to the   distal tibia. There is also a mildly displaced posterior malleolus   fracture. No acute fracture is seen in the more proximal tibia or   fibula.      MANDA WASHBURN MD            SYSTEM ID:  [id number]             Critical care was not performed.     Medical Decision Making  The patient's presentation was of moderate complexity (an acute complicated injury).    The patient's evaluation involved:  ordering and/or review of 3+ test(s) in this encounter (see separate area of note for details)    The patient's management necessitated moderate risk (a decision regarding minor procedure (fracture care with reduction and splint) with risk factors of none).    Assessment & Plan    Acute left ankle trimalleolar fx with lateral displacement, close and no neurovascular compromise, D/W Ortho-closed reduction and stirrup splint done, post reduction kyaw with good reduction, will discharge with pain meds, crutches with no weight bearing and Ortho follow up within one week.    I have reviewed the nursing notes. I have reviewed the findings, diagnosis, plan and need for follow up with the patient.    Discharge Medication List as of 6/10/2024  2:59 PM        START taking these medications    Details   oxyCODONE (ROXICODONE) 5 MG tablet Take 1 tablet (5 mg) by mouth every 6 hours as needed for pain, Disp-12 tablet, R-0, Local Print             Final diagnoses:   Trimalleolar fracture of ankle, closed, left, initial encounter   IFidencio, am serving as a trained medical scribe to document  services personally performed by Ellie Esquivel MD, based to the provider's statements to me.     I, Ellie Esquivel MD, was physically present and have reviewed and verified the accuracy of this note documented by Fidencio Arzate.       Ellie Esquivel MD  [facility] EMERGENCY DEPARTMENT  6/10/2024     Ellie Esquivel MD  06/10/24 [id number]

## 2024-06-11 NOTE — TELEPHONE ENCOUNTER
Left Voicemail (1st Attempt) on mobile number for the patient to call back and schedule the following:    Appointment type: New foot/ankle  Provider: Dr. Carmona   Return date: 6/14 JUAN MANUEL ok per Faraz

## 2024-06-11 NOTE — TELEPHONE ENCOUNTER
Patient's daughter on mobile number confirmed scheduled appointment:  Date: 6/14  Time: 1:20 alexis Ruth  Visit type: New foot/ankle  Provider: Dr. Carmona

## 2024-06-14 ENCOUNTER — PRE VISIT (OUTPATIENT)
Dept: ORTHOPEDICS | Facility: CLINIC | Age: 64
End: 2024-06-14

## 2024-06-14 ENCOUNTER — OFFICE VISIT (OUTPATIENT)
Dept: ORTHOPEDICS | Facility: CLINIC | Age: 64
End: 2024-06-14
Payer: COMMERCIAL

## 2024-06-14 DIAGNOSIS — S82.852A CLOSED TRIMALLEOLAR FRACTURE OF LEFT ANKLE, INITIAL ENCOUNTER: Primary | ICD-10-CM

## 2024-06-14 PROCEDURE — 99203 OFFICE O/P NEW LOW 30 MIN: CPT | Mod: 24 | Performed by: ORTHOPAEDIC SURGERY

## 2024-06-14 NOTE — LETTER
6/14/2024      Radha Rocha  [address]  [place] [address]      Dear Colleague,    Thank you for referring your patient, Radha Rocha, to the [facility] [place]. Please see a copy of my visit note below.    Orthopaedic Surgery Clinic Note - New Patient    Chief Complaint:  Left ankle injury.    History:  I had the opportunity to meet this pleasant 64-year-old patient today, as a new patient to me, for the above chief complaint of about 4 days' duration.  She was apparently walking down some steps, and accidentally fell backwards, injuring her left ankle as she did so.  She denies head trauma or loss of consciousness.  She denies any previous history of left ankle injuries prior to this event 4 days ago.  She was then seen in the ED, where a closed reduction was apparently performed and a splint was applied.  She denies any other complaints of pain at this time other than her left ankle.  She rates her pain as 10/10 in the left ankle.  She endorses being nonweightbearing as instructed on the left lower extremity.  She is seen and examined here today with her adult daughter present as well as the professional [facility]  assisting by video.    Past Medical History:  Past Medical History:   Diagnosis Date    Hypertension      Allergies:   Allergies   Allergen Reactions    Loratadine Itching    Tizanidine Itching     Social History:  Social History     Occupational History    Not on file   Tobacco Use    Smoking status: Never    Smokeless tobacco: Never   Substance and Sexual Activity    Alcohol use: No    Drug use: No    Sexual activity: Yes     Partners: Male     Medications:  Current Outpatient Medications   Medication Sig Dispense Refill    amLODIPine (NORVASC) 5 MG tablet Take 1 tablet by mouth daily      carboxymethylcellulose (REFRESH PLUS) 0.5 % SOLN ophthalmic solution Apply 1 drop to eye 4 times daily 30 mL 3    cyclobenzaprine (FLEXERIL) 5 MG tablet  "Take 1 tablet (5 mg) by mouth nightly as needed for muscle spasms or other (knee pain) 30 tablet 3    diclofenac (VOLTAREN) 1 % topical gel Apply 2 g topically 4 times daily 150 g 3    erythromycin (ROMYCIN) ophthalmic ointment Place 0.25 inches into the right eye At Bedtime 3.5 g 1    meloxicam (MOBIC) 15 MG tablet Take 1 tablet (15 mg) by mouth daily Take with food 30 tablet 3    omeprazole (PRILOSEC) 40 MG DR capsule Take 40 mg by mouth daily       Exam:  There were no vitals taken for this visit.  Recorded Estimated body mass index is 29.86 kg/m  as calculated from the following:    Height as of 5/24/22: 1.676 m (5' 6\").    Weight as of 5/24/22: 83.9 kg (185 lb).  General:  On examination today, the patient is a pleasant, cooperative, awake, and alert adult sitting upright in no acute distress.  Left lower extremity splint in place.  Pulm:  Respirations are nonlabored and regular on room air.  Dermatologic:  The skin on the left forefoot and knee, proximal and distal to the splint, is intact.  Circulation: The left forefoot appears to be warm and well-perfused with brisk capillary refill to all 5 toes.  Neurologic:  Light touch sensation is endorsed as intact in all dermatomes of the left forefoot.  5/5 motor strength for left EHL, EDL, FHL, and FDL.  No pain with passive motion of the toes.    Imaging:  Independent review of imaging studies was performed including nonweightbearing left ankle radiographs pre and post reduction and left tib-fib radiographs, all dated 06/10/2024.  The relevant findings were discussed with and shown to the patient.  Trimalleolar left ankle fracture-subluxation with initial lateral talar shift on the injury radiographs, with subsequent appropriate reduction with overlying splint material visible.    Impression:  64-year-old patient with closed left trimalleolar ankle fracture-subluxation.     Plan:  The above diagnosis and findings, the expected and possible prognoses, and both " nonsurgical and surgical treatment options were discussed with the patient all in layman's terms.  Full opportunity was given to the patient to participate in the shared decision-making process.   I would recommend ORIF of this injury and discussed what this entails as well as the rationale why.  The patient verbalized the wish to proceed with surgery.  The nature of the surgery, the risks, benefits, and alternatives, and postoperative plan, as well as realistic expectations for outcome for this severe injury, were all discussed in layman's terms.  No guarantees were expressed or implied as to outcome.  She is instructed to remain nonweightbearing and elevate the left lower extremity.  All questions that this very pleasant patient had at the time were answered to the best of my ability, and she verbalized understanding of and agreement with the treatment plan.    Disclaimer:  This note consists of symbols derived from keyboarding, dictation, and/or voice recognition software.  As a result, although I do diligently check for accuracy, there may be errors in the script that have gone undetected.  Please consider this when interpreting information found in this chart.      Linwood Carmona MD

## 2024-06-14 NOTE — NURSING NOTE
Pre-Op Teaching was done in person at the clinic.    Teaching Flowsheet   Relevant Diagnosis: Pre-Op Teaching  Teaching Topic: ORIF left trimalleolar ankle fracture-subluxation      Person(s) involved in teaching:   Patient and Daughter     Motivation Level:  Asks Questions: Yes  Eager to Learn: Yes  Cooperative: Yes  Receptive (willing/able to accept information): Yes  Any cultural factors/Buddhism beliefs that may influence understanding or compliance? No     Patient demonstrates understanding of the following:  Reason for the appointment, diagnosis and treatment plan: Yes  Knowledge of proper use of medications and conditions for which they are ordered (with special attention to potential side effects or drug interactions): Yes  Which situations necessitate calling provider and whom to contact: Yes- discussed the stoplight tool to help assist with this.      Teaching Concerns Addressed:      Proper use of surgical scrub explain: Yes    Nutritional needs and diet plan: Yes  Pain management techniques: Yes  Wound Care: Yes  How and/when to access community resources: Yes  Need for pre-op with in 30 days: Yes  -I asked them to ensure they go over their daily medications during this visit and discuss what medications need to be stopped before surgery and when. If you are doing a pre-op with your PCP and they are not within the [facility] [facility], I ask them to fax it to our pre-op office. Patient verbalized understanding.      Instructional Materials Used/Given:  a bottle of chlorhexidine soap and a surgery packet given to patient in clinic.      - Important contact info/ phone numbers: emphasizing clinic number and after hours number  - Map/ location of surgery  - Showering instructions  - Stop light tool    Additionally the following was discussed with patient:  - DaughterMaxine will be driving the patient to surgery and staying with them for 24 hours.       -Next step: Schedule a surgery date and use ED visit for  pre-op H&P.     Time spent with patient: 15 minutes.

## 2024-06-14 NOTE — NURSING NOTE
Reason For Visit:   Chief Complaint   Patient presents with    Consult     New patient consult for trimalleolar Fx of left ankle.  DOI 6/10/24, seen in ED at that time.  She presents to clinic in a wheelchair and her left ankle is splinted.         There were no vitals taken for this visit.    Pain Assessment  Patient Currently in Pain: Yes  0-10 Pain Scale: 10  Primary Pain Location: Ankle (left ankle)    Faraz Jensen, ATC

## 2024-06-14 NOTE — PROGRESS NOTES
Orthopaedic Surgery Clinic Note - New Patient    Chief Complaint:  Left ankle injury.    History:  I had the opportunity to meet this pleasant 64-year-old patient today, as a new patient to me, for the above chief complaint of about 4 days' duration.  She was apparently walking down some steps, and accidentally fell backwards, injuring her left ankle as she did so.  She denies head trauma or loss of consciousness.  She denies any previous history of left ankle injuries prior to this event 4 days ago.  She was then seen in the ED, where a closed reduction was apparently performed and a splint was applied.  She denies any other complaints of pain at this time other than her left ankle.  She rates her pain as 10/10 in the left ankle.  She endorses being nonweightbearing as instructed on the left lower extremity.  She is seen and examined here today with her adult daughter present as well as the professional [place]  assisting by video.    Past Medical History:  Past Medical History:   Diagnosis Date    Hypertension      Allergies:   Allergies   Allergen Reactions    Loratadine Itching    Tizanidine Itching     Social History:  Social History     Occupational History    Not on file   Tobacco Use    Smoking status: Never    Smokeless tobacco: Never   Substance and Sexual Activity    Alcohol use: No    Drug use: No    Sexual activity: Yes     Partners: Male     Medications:  Current Outpatient Medications   Medication Sig Dispense Refill    amLODIPine (NORVASC) 5 MG tablet Take 1 tablet by mouth daily      carboxymethylcellulose (REFRESH PLUS) 0.5 % SOLN ophthalmic solution Apply 1 drop to eye 4 times daily 30 mL 3    cyclobenzaprine (FLEXERIL) 5 MG tablet Take 1 tablet (5 mg) by mouth nightly as needed for muscle spasms or other (knee pain) 30 tablet 3    diclofenac (VOLTAREN) 1 % topical gel Apply 2 g topically 4 times daily 150 g 3    erythromycin (ROMYCIN) ophthalmic ointment Place 0.25 inches into the right  "eye At Bedtime 3.5 g 1    meloxicam (MOBIC) 15 MG tablet Take 1 tablet (15 mg) by mouth daily Take with food 30 tablet 3    omeprazole (PRILOSEC) 40 MG DR capsule Take 40 mg by mouth daily       Exam:  There were no vitals taken for this visit.  Recorded Estimated body mass index is 29.86 kg/m  as calculated from the following:    Height as of 5/24/22: 1.676 m (5' 6\").    Weight as of 5/24/22: 83.9 kg (185 lb).  General:  On examination today, the patient is a pleasant, cooperative, awake, and alert adult sitting upright in no acute distress.  Left lower extremity splint in place.  Pulm:  Respirations are nonlabored and regular on room air.  Dermatologic:  The skin on the left forefoot and knee, proximal and distal to the splint, is intact.  Circulation: The left forefoot appears to be warm and well-perfused with brisk capillary refill to all 5 toes.  Neurologic:  Light touch sensation is endorsed as intact in all dermatomes of the left forefoot.  5/5 motor strength for left EHL, EDL, FHL, and FDL.  No pain with passive motion of the toes.    Imaging:  Independent review of imaging studies was performed including nonweightbearing left ankle radiographs pre and post reduction and left tib-fib radiographs, all dated 06/10/2024.  The relevant findings were discussed with and shown to the patient.  Trimalleolar left ankle fracture-subluxation with initial lateral talar shift on the injury radiographs, with subsequent appropriate reduction with overlying splint material visible.    Impression:  64-year-old patient with closed left trimalleolar ankle fracture-subluxation.     Plan:  The above diagnosis and findings, the expected and possible prognoses, and both nonsurgical and surgical treatment options were discussed with the patient all in layman's terms.  Full opportunity was given to the patient to participate in the shared decision-making process.   I would recommend ORIF of this injury and discussed what this " entails as well as the rationale why.  The patient verbalized the wish to proceed with surgery.  The nature of the surgery, the risks, benefits, and alternatives, and postoperative plan, as well as realistic expectations for outcome for this severe injury, were all discussed in layman's terms.  No guarantees were expressed or implied as to outcome.  She is instructed to remain nonweightbearing and elevate the left lower extremity.  All questions that this very pleasant patient had at the time were answered to the best of my ability, and she verbalized understanding of and agreement with the treatment plan.    Disclaimer:  This note consists of symbols derived from keyboarding, dictation, and/or voice recognition software.  As a result, although I do diligently check for accuracy, there may be errors in the script that have gone undetected.  Please consider this when interpreting information found in this chart.

## 2024-06-19 ENCOUNTER — TELEPHONE (OUTPATIENT)
Dept: ORTHOPEDICS | Facility: CLINIC | Age: 64
End: 2024-06-19
Payer: COMMERCIAL

## 2024-06-19 NOTE — TELEPHONE ENCOUNTER
Other: Maxine called her mom Radha has been waiting for someone to call her to schedule surgery with Dr. Valente for her leg fracture but she has not heard anything. Please call Maxine to schedule the surgery.     Could we send this information to you in [employer] or would you prefer to receive a phone call?:   Patient would prefer a phone call   Okay to leave a detailed message?: Yes at Other number on file [phone] Maxine

## 2024-06-21 PROBLEM — S82.852A CLOSED TRIMALLEOLAR FRACTURE OF LEFT ANKLE, INITIAL ENCOUNTER: Status: ACTIVE | Noted: 2024-06-14

## 2024-06-21 NOTE — TELEPHONE ENCOUNTER
Hello,    Pts daughter called 3x now about scheduling surgery with Dr. Carmona.  Please give them a call back at .  Its been a week.

## 2024-06-21 NOTE — TELEPHONE ENCOUNTER
Patient is scheduled for surgery with Dr. Carmona    Spoke with: patient's daughter, Maxine, and Mosotho     Date of Surgery: 6/26/24    Location: ASC    Post op: 7/8/24, 8/12/24    Pre op with Provider complete    H&P: [facility] ED 6/10/24    Additional imaging/appointments: N/A    Surgery packet: received     Additional comments: N/A        Sharonda Coleman CMA on 6/21/2024 at 4:19 PM

## 2024-06-25 ENCOUNTER — ANESTHESIA EVENT (OUTPATIENT)
Dept: SURGERY | Facility: AMBULATORY SURGERY CENTER | Age: 64
End: 2024-06-25
Payer: COMMERCIAL

## 2024-06-26 ENCOUNTER — ANCILLARY PROCEDURE (OUTPATIENT)
Dept: RADIOLOGY | Facility: AMBULATORY SURGERY CENTER | Age: 64
End: 2024-06-26
Attending: ORTHOPAEDIC SURGERY
Payer: COMMERCIAL

## 2024-06-26 ENCOUNTER — HOSPITAL ENCOUNTER (OUTPATIENT)
Facility: AMBULATORY SURGERY CENTER | Age: 64
Discharge: HOME | End: 2024-06-26
Attending: ORTHOPAEDIC SURGERY | Admitting: ORTHOPAEDIC SURGERY
Payer: COMMERCIAL

## 2024-06-26 ENCOUNTER — ANESTHESIA (OUTPATIENT)
Dept: SURGERY | Facility: AMBULATORY SURGERY CENTER | Age: 64
End: 2024-06-26
Payer: COMMERCIAL

## 2024-06-26 VITALS
RESPIRATION RATE: 16 BRPM | DIASTOLIC BLOOD PRESSURE: 83 MMHG | HEIGHT: 66 IN | OXYGEN SATURATION: 97 % | SYSTOLIC BLOOD PRESSURE: 137 MMHG | TEMPERATURE: 98.6 F | WEIGHT: 185 LBS | BODY MASS INDEX: 29.73 KG/M2 | HEART RATE: 82 BPM

## 2024-06-26 DIAGNOSIS — S82.852A CLOSED TRIMALLEOLAR FRACTURE OF LEFT ANKLE, INITIAL ENCOUNTER: ICD-10-CM

## 2024-06-26 DIAGNOSIS — S82.852A CLOSED TRIMALLEOLAR FRACTURE OF LEFT ANKLE, INITIAL ENCOUNTER: Primary | ICD-10-CM

## 2024-06-26 PROCEDURE — C9290 INJ, BUPIVACAINE LIPOSOME: HCPCS

## 2024-06-26 PROCEDURE — 64445 NJX AA&/STRD SCIATIC NRV IMG: CPT | Mod: 59 | Performed by: ANESTHESIOLOGY

## 2024-06-26 PROCEDURE — 27829 TREAT LOWER LEG JOINT: CPT | Performed by: ANESTHESIOLOGY

## 2024-06-26 PROCEDURE — 27822 TREATMENT OF ANKLE FRACTURE: CPT | Mod: GC | Performed by: ORTHOPAEDIC SURGERY

## 2024-06-26 PROCEDURE — 27829 TREAT LOWER LEG JOINT: CPT | Mod: LT

## 2024-06-26 PROCEDURE — 27829 TREAT LOWER LEG JOINT: CPT | Performed by: NURSE ANESTHETIST, CERTIFIED REGISTERED

## 2024-06-26 PROCEDURE — 27829 TREAT LOWER LEG JOINT: CPT | Mod: GC | Performed by: ORTHOPAEDIC SURGERY

## 2024-06-26 PROCEDURE — 27822 TREATMENT OF ANKLE FRACTURE: CPT | Mod: LT

## 2024-06-26 PROCEDURE — C1713 ANCHOR/SCREW BN/BN,TIS/BN: HCPCS

## 2024-06-26 DEVICE — IMP SCR ARTHREX CORTICAL 3.5MMX14MM AR-8935-14: Type: IMPLANTABLE DEVICE | Site: ANKLE | Status: FUNCTIONAL

## 2024-06-26 DEVICE — IMPLANTABLE DEVICE: Type: IMPLANTABLE DEVICE | Site: ANKLE | Status: FUNCTIONAL

## 2024-06-26 DEVICE — IMP SCREW BONE KREULOCK 3X10MM AR-8933VCL-10: Type: IMPLANTABLE DEVICE | Site: ANKLE | Status: FUNCTIONAL

## 2024-06-26 DEVICE — K-LESS T-ROPE W/DRV, SYN REPR, TI
Type: IMPLANTABLE DEVICE | Site: ANKLE | Status: FUNCTIONAL
Brand: ARTHREX®

## 2024-06-26 RX ORDER — ONDANSETRON 2 MG/ML
4 INJECTION INTRAMUSCULAR; INTRAVENOUS EVERY 30 MIN PRN
Status: DISCONTINUED | OUTPATIENT
Start: 2024-06-26 | End: 2024-06-27 | Stop reason: HOSPADM

## 2024-06-26 RX ORDER — MEPERIDINE HYDROCHLORIDE 25 MG/ML
12.5 INJECTION INTRAMUSCULAR; INTRAVENOUS; SUBCUTANEOUS EVERY 5 MIN PRN
Status: DISCONTINUED | OUTPATIENT
Start: 2024-06-26 | End: 2024-06-27 | Stop reason: HOSPADM

## 2024-06-26 RX ORDER — NALOXONE HYDROCHLORIDE 0.4 MG/ML
0.4 INJECTION, SOLUTION INTRAMUSCULAR; INTRAVENOUS; SUBCUTANEOUS
Status: DISCONTINUED | OUTPATIENT
Start: 2024-06-26 | End: 2024-06-27 | Stop reason: HOSPADM

## 2024-06-26 RX ORDER — CEFAZOLIN SODIUM 2 G/50ML
2 SOLUTION INTRAVENOUS SEE ADMIN INSTRUCTIONS
Status: DISCONTINUED | OUTPATIENT
Start: 2024-06-26 | End: 2024-06-27 | Stop reason: HOSPADM

## 2024-06-26 RX ORDER — HYDROXYZINE HYDROCHLORIDE 25 MG/1
25 TABLET, FILM COATED ORAL
Status: DISCONTINUED | OUTPATIENT
Start: 2024-06-26 | End: 2024-06-27 | Stop reason: HOSPADM

## 2024-06-26 RX ORDER — OXYCODONE HYDROCHLORIDE 5 MG/1
10 TABLET ORAL
Status: DISCONTINUED | OUTPATIENT
Start: 2024-06-26 | End: 2024-06-27 | Stop reason: HOSPADM

## 2024-06-26 RX ORDER — LABETALOL HYDROCHLORIDE 5 MG/ML
10 INJECTION, SOLUTION INTRAVENOUS
Status: DISCONTINUED | OUTPATIENT
Start: 2024-06-26 | End: 2024-06-27 | Stop reason: HOSPADM

## 2024-06-26 RX ORDER — LIDOCAINE 40 MG/G
CREAM TOPICAL
Status: DISCONTINUED | OUTPATIENT
Start: 2024-06-26 | End: 2024-06-27 | Stop reason: HOSPADM

## 2024-06-26 RX ORDER — PROPOFOL 10 MG/ML
INJECTION, EMULSION INTRAVENOUS CONTINUOUS PRN
Status: DISCONTINUED | OUTPATIENT
Start: 2024-06-26 | End: 2024-06-26

## 2024-06-26 RX ORDER — NALOXONE HYDROCHLORIDE 0.4 MG/ML
0.1 INJECTION, SOLUTION INTRAMUSCULAR; INTRAVENOUS; SUBCUTANEOUS
Status: DISCONTINUED | OUTPATIENT
Start: 2024-06-26 | End: 2024-06-27 | Stop reason: HOSPADM

## 2024-06-26 RX ORDER — LABETALOL 20 MG/4 ML (5 MG/ML) INTRAVENOUS SYRINGE
PRN
Status: DISCONTINUED | OUTPATIENT
Start: 2024-06-26 | End: 2024-06-26

## 2024-06-26 RX ORDER — SODIUM CHLORIDE, SODIUM LACTATE, POTASSIUM CHLORIDE, CALCIUM CHLORIDE 600; 310; 30; 20 MG/100ML; MG/100ML; MG/100ML; MG/100ML
INJECTION, SOLUTION INTRAVENOUS CONTINUOUS
Status: DISCONTINUED | OUTPATIENT
Start: 2024-06-26 | End: 2024-06-27 | Stop reason: HOSPADM

## 2024-06-26 RX ORDER — ONDANSETRON 4 MG/1
4 TABLET, ORALLY DISINTEGRATING ORAL
Status: DISCONTINUED | OUTPATIENT
Start: 2024-06-26 | End: 2024-06-27 | Stop reason: HOSPADM

## 2024-06-26 RX ORDER — HYDROMORPHONE HYDROCHLORIDE 1 MG/ML
0.2 INJECTION, SOLUTION INTRAMUSCULAR; INTRAVENOUS; SUBCUTANEOUS EVERY 5 MIN PRN
Status: DISCONTINUED | OUTPATIENT
Start: 2024-06-26 | End: 2024-06-27 | Stop reason: HOSPADM

## 2024-06-26 RX ORDER — ONDANSETRON 2 MG/ML
INJECTION INTRAMUSCULAR; INTRAVENOUS PRN
Status: DISCONTINUED | OUTPATIENT
Start: 2024-06-26 | End: 2024-06-26

## 2024-06-26 RX ORDER — MAGNESIUM HYDROXIDE 1200 MG/15ML
LIQUID ORAL PRN
Status: DISCONTINUED | OUTPATIENT
Start: 2024-06-26 | End: 2024-06-26 | Stop reason: HOSPADM

## 2024-06-26 RX ORDER — BUPIVACAINE HYDROCHLORIDE 5 MG/ML
INJECTION, SOLUTION EPIDURAL; INTRACAUDAL
Status: COMPLETED | OUTPATIENT
Start: 2024-06-26 | End: 2024-06-26

## 2024-06-26 RX ORDER — OXYCODONE HYDROCHLORIDE 5 MG/1
5 TABLET ORAL
Status: DISCONTINUED | OUTPATIENT
Start: 2024-06-26 | End: 2024-06-27 | Stop reason: HOSPADM

## 2024-06-26 RX ORDER — FENTANYL CITRATE 50 UG/ML
25-50 INJECTION, SOLUTION INTRAMUSCULAR; INTRAVENOUS
Status: DISCONTINUED | OUTPATIENT
Start: 2024-06-26 | End: 2024-06-27 | Stop reason: HOSPADM

## 2024-06-26 RX ORDER — FLUMAZENIL 0.1 MG/ML
0.2 INJECTION, SOLUTION INTRAVENOUS
Status: DISCONTINUED | OUTPATIENT
Start: 2024-06-26 | End: 2024-06-27 | Stop reason: HOSPADM

## 2024-06-26 RX ORDER — NALOXONE HYDROCHLORIDE 0.4 MG/ML
0.2 INJECTION, SOLUTION INTRAMUSCULAR; INTRAVENOUS; SUBCUTANEOUS
Status: DISCONTINUED | OUTPATIENT
Start: 2024-06-26 | End: 2024-06-27 | Stop reason: HOSPADM

## 2024-06-26 RX ORDER — DEXAMETHASONE SODIUM PHOSPHATE 10 MG/ML
4 INJECTION, SOLUTION INTRAMUSCULAR; INTRAVENOUS
Status: DISCONTINUED | OUTPATIENT
Start: 2024-06-26 | End: 2024-06-27 | Stop reason: HOSPADM

## 2024-06-26 RX ORDER — LIDOCAINE HYDROCHLORIDE 20 MG/ML
INJECTION, SOLUTION INFILTRATION; PERINEURAL PRN
Status: DISCONTINUED | OUTPATIENT
Start: 2024-06-26 | End: 2024-06-26

## 2024-06-26 RX ORDER — DEXAMETHASONE SODIUM PHOSPHATE 4 MG/ML
INJECTION, SOLUTION INTRA-ARTICULAR; INTRALESIONAL; INTRAMUSCULAR; INTRAVENOUS; SOFT TISSUE PRN
Status: DISCONTINUED | OUTPATIENT
Start: 2024-06-26 | End: 2024-06-26

## 2024-06-26 RX ORDER — FENTANYL CITRATE 50 UG/ML
25 INJECTION, SOLUTION INTRAMUSCULAR; INTRAVENOUS
Status: DISCONTINUED | OUTPATIENT
Start: 2024-06-26 | End: 2024-06-27 | Stop reason: HOSPADM

## 2024-06-26 RX ORDER — OXYCODONE HYDROCHLORIDE 5 MG/1
5 TABLET ORAL
Status: COMPLETED | OUTPATIENT
Start: 2024-06-26 | End: 2024-06-26

## 2024-06-26 RX ORDER — CEFAZOLIN SODIUM 2 G/50ML
2 SOLUTION INTRAVENOUS
Status: COMPLETED | OUTPATIENT
Start: 2024-06-26 | End: 2024-06-26

## 2024-06-26 RX ORDER — FENTANYL CITRATE 50 UG/ML
50 INJECTION, SOLUTION INTRAMUSCULAR; INTRAVENOUS EVERY 5 MIN PRN
Status: DISCONTINUED | OUTPATIENT
Start: 2024-06-26 | End: 2024-06-27 | Stop reason: HOSPADM

## 2024-06-26 RX ORDER — ONDANSETRON 4 MG/1
4 TABLET, ORALLY DISINTEGRATING ORAL EVERY 30 MIN PRN
Status: DISCONTINUED | OUTPATIENT
Start: 2024-06-26 | End: 2024-06-27 | Stop reason: HOSPADM

## 2024-06-26 RX ORDER — OXYCODONE HYDROCHLORIDE 5 MG/1
5 TABLET ORAL EVERY 6 HOURS PRN
Qty: 16 TABLET | Refills: 0 | Status: SHIPPED | OUTPATIENT
Start: 2024-06-26 | End: 2024-06-30

## 2024-06-26 RX ORDER — PROPOFOL 10 MG/ML
INJECTION, EMULSION INTRAVENOUS PRN
Status: DISCONTINUED | OUTPATIENT
Start: 2024-06-26 | End: 2024-06-26

## 2024-06-26 RX ORDER — ACETAMINOPHEN 325 MG/1
650 TABLET ORAL
Status: DISCONTINUED | OUTPATIENT
Start: 2024-06-26 | End: 2024-06-27 | Stop reason: HOSPADM

## 2024-06-26 RX ORDER — ASPIRIN 81 MG/1
162 TABLET, CHEWABLE ORAL DAILY
Qty: 84 TABLET | Refills: 0 | Status: SHIPPED | OUTPATIENT
Start: 2024-06-26 | End: 2024-08-07

## 2024-06-26 RX ORDER — HYDROMORPHONE HYDROCHLORIDE 1 MG/ML
0.4 INJECTION, SOLUTION INTRAMUSCULAR; INTRAVENOUS; SUBCUTANEOUS EVERY 5 MIN PRN
Status: DISCONTINUED | OUTPATIENT
Start: 2024-06-26 | End: 2024-06-27 | Stop reason: HOSPADM

## 2024-06-26 RX ORDER — TRANEXAMIC ACID 650 MG/1
1950 TABLET ORAL ONCE
Status: DISCONTINUED | OUTPATIENT
Start: 2024-06-26 | End: 2024-06-27 | Stop reason: HOSPADM

## 2024-06-26 RX ORDER — ACETAMINOPHEN 325 MG/1
975 TABLET ORAL ONCE
Status: COMPLETED | OUTPATIENT
Start: 2024-06-26 | End: 2024-06-26

## 2024-06-26 RX ORDER — CELECOXIB 200 MG/1
400 CAPSULE ORAL ONCE
Status: COMPLETED | OUTPATIENT
Start: 2024-06-26 | End: 2024-06-26

## 2024-06-26 RX ORDER — ACETAMINOPHEN 325 MG/1
325-650 TABLET ORAL EVERY 6 HOURS PRN
COMMUNITY

## 2024-06-26 RX ORDER — METHOCARBAMOL 750 MG/1
750 TABLET, FILM COATED ORAL
Status: DISCONTINUED | OUTPATIENT
Start: 2024-06-26 | End: 2024-06-27 | Stop reason: HOSPADM

## 2024-06-26 RX ORDER — FENTANYL CITRATE 50 UG/ML
25 INJECTION, SOLUTION INTRAMUSCULAR; INTRAVENOUS EVERY 5 MIN PRN
Status: DISCONTINUED | OUTPATIENT
Start: 2024-06-26 | End: 2024-06-27 | Stop reason: HOSPADM

## 2024-06-26 RX ADMIN — LABETALOL 20 MG/4 ML (5 MG/ML) INTRAVENOUS SYRINGE 10 MG: at 14:46

## 2024-06-26 RX ADMIN — FENTANYL CITRATE 25 MCG: 50 INJECTION, SOLUTION INTRAMUSCULAR; INTRAVENOUS at 15:43

## 2024-06-26 RX ADMIN — ONDANSETRON 4 MG: 2 INJECTION INTRAMUSCULAR; INTRAVENOUS at 14:48

## 2024-06-26 RX ADMIN — PROPOFOL 150 MCG/KG/MIN: 10 INJECTION, EMULSION INTRAVENOUS at 13:59

## 2024-06-26 RX ADMIN — CELECOXIB 400 MG: 200 CAPSULE ORAL at 11:50

## 2024-06-26 RX ADMIN — ACETAMINOPHEN 975 MG: 325 TABLET ORAL at 16:56

## 2024-06-26 RX ADMIN — PROPOFOL 150 MG: 10 INJECTION, EMULSION INTRAVENOUS at 13:21

## 2024-06-26 RX ADMIN — DEXAMETHASONE SODIUM PHOSPHATE 4 MG: 4 INJECTION, SOLUTION INTRA-ARTICULAR; INTRALESIONAL; INTRAMUSCULAR; INTRAVENOUS; SOFT TISSUE at 13:28

## 2024-06-26 RX ADMIN — LIDOCAINE HYDROCHLORIDE 100 MG: 20 INJECTION, SOLUTION INFILTRATION; PERINEURAL at 13:21

## 2024-06-26 RX ADMIN — Medication 0.5 MG: at 13:44

## 2024-06-26 RX ADMIN — BUPIVACAINE HYDROCHLORIDE 10 ML: 5 INJECTION, SOLUTION EPIDURAL; INTRACAUDAL at 12:31

## 2024-06-26 RX ADMIN — FENTANYL CITRATE 50 MCG: 50 INJECTION, SOLUTION INTRAMUSCULAR; INTRAVENOUS at 12:18

## 2024-06-26 RX ADMIN — FENTANYL CITRATE 50 MCG: 50 INJECTION, SOLUTION INTRAMUSCULAR; INTRAVENOUS at 16:39

## 2024-06-26 RX ADMIN — SODIUM CHLORIDE, SODIUM LACTATE, POTASSIUM CHLORIDE, CALCIUM CHLORIDE: 600; 310; 30; 20 INJECTION, SOLUTION INTRAVENOUS at 12:00

## 2024-06-26 RX ADMIN — CEFAZOLIN SODIUM 2 G: 2 SOLUTION INTRAVENOUS at 13:08

## 2024-06-26 RX ADMIN — FENTANYL CITRATE 25 MCG: 50 INJECTION, SOLUTION INTRAMUSCULAR; INTRAVENOUS at 15:57

## 2024-06-26 RX ADMIN — PROPOFOL 150 MCG/KG/MIN: 10 INJECTION, EMULSION INTRAVENOUS at 13:21

## 2024-06-26 RX ADMIN — PROPOFOL 150 MCG/KG/MIN: 10 INJECTION, EMULSION INTRAVENOUS at 14:41

## 2024-06-26 RX ADMIN — FENTANYL CITRATE 25 MCG: 50 INJECTION, SOLUTION INTRAMUSCULAR; INTRAVENOUS at 15:52

## 2024-06-26 RX ADMIN — LABETALOL 20 MG/4 ML (5 MG/ML) INTRAVENOUS SYRINGE 5 MG: at 14:40

## 2024-06-26 RX ADMIN — OXYCODONE HYDROCHLORIDE 5 MG: 5 TABLET ORAL at 16:30

## 2024-06-26 NOTE — BRIEF OP NOTE
[facility]    Brief Operative Note    Pre-operative diagnosis: Closed trimalleolar fracture of left ankle, initial encounter [K61.537J]  Post-operative diagnosis Same as pre-operative diagnosis    Procedure: Open reduction, internal fixation of left ankle fracture, Left - Ankle    Surgeon: Surgeons and Role:     * Linwood Carmona MD - Primary     * Parker Rose MD - Resident - Assisting  Anesthesia: General with Block   Estimated Blood Loss: 25mL    Drains: None  Specimens: * No specimens in log *  Findings:   None.  Complications: None.  Implants:   Implant Name Type Inv. Item Serial No.  Lot No. LRB No. Used Action   PLATE BN TI 5 HL LCK FIB LT DIST - [id number] Metallic Hardware/Treichlers PLATE BN TI 5 HL LCK FIB LT DIST  [employer] [id number] Left 1 Implanted   IMP SCR [employer] CORTICAL [id number] AR-8935-14 - [id number] Metallic Hardware/Treichlers IMP SCR [employer] CORTICAL [id number] AR-8935-14  [employer] [id number] Left 2 Implanted   IMP SCREW BONE KREULOCK 3X10MM AR-8933VCL-10 - [id number] Metallic Hardware/Treichlers IMP SCREW BONE KREULOCK 3X10MM [id number]  [employer] [id number] Left 1 Implanted   VARIABLE ANGLE LOCKING SCREW 3.0MM / 12MM     [id number] Left 4 Implanted   VARIABLE ANGLE LOCKING SCREW 3.0MM / 16MM     [id number] Left 1 Wasted   LOW PROFILE SCREWS, LOCKING 3.5MM X 14 MM     [id number] Left 1 Implanted   QUICKFIX SCREWS, CANNULATED CANCELLOUS 4.0MM X 46MM     [id number] Left 2 Implanted   IMP SYNDESMOSIS TIGHTROPE XP TI AR-8925T - [id number] Metallic Hardware/Treichlers IMP SYNDESMOSIS TIGHTROPE XP TI AR-8925T  [employer] [id number] Left 1 Implanted   LOW PROFILE SCREWS, LOCKING 3.5MM X 14 MM     [id number] Left 1 Wasted   SCR BONE LOCKING 3.5MM 12MML TI UNRULY - [id number] Metallic Hardware/Treichlers SCR BONE LOCKING 3.5MM 12MML TI UNRULY  [employer] [id number] Left 1 Implanted       Post op plan  SDS  NWB RLE on SLS  ASA 162mg x  6wks  ADAT  Rest, ice, OTC meds, short course narcotics for pain  Dc home per PACU criteria  F/u as scheduled

## 2024-06-26 NOTE — ANESTHESIA PREPROCEDURE EVALUATION
"Anesthesia Pre-Procedure Evaluation    Patient: Radha Rocha   MRN: [id number] : 1960        Procedure : Procedure(s):  Open reduction, internal fixation of left ankle fracture          Past Medical History:   Diagnosis Date    Hypertension       Past Surgical History:   Procedure Laterality Date    EYE SURGERY      Right eye removal      Allergies   Allergen Reactions    Loratadine Itching    Tizanidine Itching      Social History     Tobacco Use    Smoking status: Never    Smokeless tobacco: Never   Substance Use Topics    Alcohol use: No      Wt Readings from Last 1 Encounters:   24 83.9 kg (185 lb)        Anesthesia Evaluation            ROS/MED HX  ENT/Pulmonary:  - neg pulmonary ROS     Neurologic:  - neg neurologic ROS     Cardiovascular:     (+)  hypertension- -   -  - -                                      METS/Exercise Tolerance: 4 - Raking leaves, gardening    Hematologic:  - neg hematologic  ROS     Musculoskeletal:  - neg musculoskeletal ROS     GI/Hepatic:     (+) GERD,                   Renal/Genitourinary:  - neg Renal ROS     Endo:  - neg endo ROS     Psychiatric/Substance Use:  - neg psychiatric ROS     Infectious Disease:  - neg infectious disease ROS     Malignancy:  - neg malignancy ROS     Other:            Physical Exam    Airway        Mallampati: II   TM distance: > 3 FB   Neck ROM: full   Mouth opening: > 3 cm    Respiratory Devices and Support         Dental       (+) Modest Abnormalities - crowns, retainers, 1 or 2 missing teeth      Cardiovascular   cardiovascular exam normal          Pulmonary   pulmonary exam normal                OUTSIDE LABS:  CBC: No results found for: \"WBC\", \"HGB\", \"HCT\", \"PLT\"  BMP: No results found for: \"NA\", \"POTASSIUM\", \"CHLORIDE\", \"CO2\", \"BUN\", \"CR\", \"GLC\"  COAGS: No results found for: \"PTT\", \"INR\", \"FIBR\"  POC: No results found for: \"BGM\", \"HCG\", \"HCGS\"  HEPATIC: No results found for: \"ALBUMIN\", \"PROTTOTAL\", \"ALT\", \"AST\", \"GGT\", \"ALKPHOS\", " "\"BILITOTAL\", \"BILIDIRECT\", \"MARLON\"  OTHER: No results found for: \"PH\", \"LACT\", \"A1C\", \"JD\", \"PHOS\", \"MAG\", \"LIPASE\", \"AMYLASE\", \"TSH\", \"T4\", \"T3\", \"CRP\", \"SED\"    Anesthesia Plan    ASA Status:  2    NPO Status:  NPO Appropriate    Anesthesia Type: General.     - Airway: LMA   Induction: Intravenous.   Maintenance: Balanced.        Consents    Anesthesia Plan(s) and associated risks, benefits, and realistic alternatives discussed. Questions answered and patient/representative(s) expressed understanding.     - Discussed: Risks, Benefits and Alternatives for BOTH SEDATION and the PROCEDURE were discussed     - Discussed with:  Patient, Other (See Comment)      - Extended Intubation/Ventilatory Support Discussed: No.      - Patient is DNR/DNI Status: No     Use of blood products discussed: No .     Postoperative Care    Pain management: IV analgesics, Peripheral nerve block (Single Shot).   PONV prophylaxis: Ondansetron (or other 5HT-3), Dexamethasone or Solumedrol     Comments:               Kvng Rosenthal MD    I have reviewed the pertinent notes and labs in the chart from the past 30 days and (re)examined the patient.  Any updates or changes from those notes are reflected in this note.              # Overweight: Estimated body mass index is 29.86 kg/m  as calculated from the following:    Height as of this encounter: 1.676 m (5' 6\").    Weight as of this encounter: 83.9 kg (185 lb).      "

## 2024-06-26 NOTE — ANESTHESIA PROCEDURE NOTES
Sciatic Procedure Note    Pre-Procedure   Staff -        Anesthesiologist:  Kvng Rosenthal MD       Performed By: anesthesiologist       Location: pre-op       Procedure Start/Stop Times: 6/26/2024 12:31 PM and 6/26/2024 12:41 PM       Pre-Anesthestic Checklist: patient identified, IV checked, site marked, risks and benefits discussed, informed consent, monitors and equipment checked, pre-op evaluation, at physician/surgeon's request and post-op pain management  Timeout:       Correct Patient: Yes        Correct Procedure: Yes        Correct Site: Yes        Correct Position: Yes        Correct Laterality: Yes        Site Marked: Yes  Procedure Documentation  Procedure: Sciatic       Laterality: left       Patient Position: lateral       Skin prep: Chloraprep       Local skin infiltrated with mL of 1% lidocaine.  (popliteal approach).       Needle Gauge: 21.        Needle Length (millimeters): 100        Ultrasound guided       1. Ultrasound was used to identify targeted nerve, plexus, vascular marker, or fascial plane and place a needle adjacent to it in real-time.       2. Ultrasound was used to visualize the spread of anesthetic in close proximity to the above referenced structure.       3. A permanent image is entered into the patient's record.       4. The visualized anatomic structures appeared normal.       5. There were no apparent abnormal pathologic findings.    Assessment/Narrative         The placement was negative for: blood aspirated and painful injection       Bolus given via needle. no blood aspirated via catheter.        Secured via.        Insertion/Infusion Method: Single Shot       Complications: none    Medication(s) Administered   Bupivacaine 0.5% PF (Infiltration) - Infiltration   10 mL - 6/26/2024 12:31:00 PM  Bupivacaine liposome (Exparel) 1.3% LA inj susp (Infiltration) - Infiltration   10 mL - 6/26/2024 12:31:00 PM  Medication Administration Time: 6/26/2024 12:31 PM     Comments:  Exparel  "1.3% 10 ml and bupivacaine 0.5% 10ml injected into left sciatic nerve       FOR [facility] ONLY:   Pain Team Contact information: please page the Pain Team Via [employer]. Search \"Pain\". During daytime hours, please page the attending first. At night please page the resident first.      "

## 2024-06-26 NOTE — DISCHARGE INSTRUCTIONS
[facility]  Home Care Following Anesthesia  For 24 hours after surgery:  Get plenty of rest.  A responsible adult must stay with you for at least 24 hours after you leave the surgery center.  Do not drive or use heavy equipment.  If you have weakness or tingling, don't drive or use heavy equipment until this feeling goes away.   Do not drink alcohol.   Avoid strenuous or risky activities.  Ask for help when climbing stairs.  You may feel lightheaded.  IF so, sit for a few minutes before standing.  Have someone help you get up.   If you have nausea (feel sick to your stomach): Drink only clear liquids such as apple juice, ginger ale, broth or 7-Up.  Rest may also help.  Be sure to drink enough fluids.  Move to a regular diet as you feel able.   You may have a slight fever.  Call the doctor if your fever is over 100 F (37.7 C) (taken under the tongue) or lasts longer than 24 hours.  You may have a dry mouth, a sore throat, muscle aches or trouble sleeping. These should go away after 24 hours.  Do not make important or legal decisions.   It is recommended to avoid smoking.               Tips for taking pain medications  To get the best pain relief possible, remember these points:  Take pain medications as directed, before pain becomes severe.  Pain medication can upset your stomach: taking it with food may help.  Constipation is a common side effect of pain medication. Drink plenty of  fluids.  Eat foods high in fiber. Take a stool softener if recommended by your doctor or pharmacist.  Do not drink alcohol, drive or operate machinery while taking pain medications.  Ask about other ways to control pain, such as with heat, ice or relaxation.    Tylenol/Acetaminophen Consumption    If you feel your pain relief is insufficient, you may take Tylenol/Acetaminophen in addition to your narcotic pain medication.   Be careful not to exceed 4,000 mg of Tylenol/Acetaminophen in a 24 hour  period from all sources.  If you are taking extra strength Tylenol/acetaminophen (500 mg), the maximum dose is 8 tablets in 24 hours.  If you are taking regular strength acetaminophen (325 mg), the maximum dose is 12 tablets in 24 hours.    Call a doctor for any of the following:  Signs of infection (fever, growing tenderness at the surgery site, a large amount of drainage or bleeding, severe pain, foul-smelling drainage, redness, swelling).  It has been over 8 to 10 hours since surgery and you are still not able to urinate (pass water).  Headache for over 24 hours.  Numbness, tingling or weakness the day after surgery (if you had spinal anesthesia).  Signs of Covid-19 infection (temperature over 100 degrees, shortness of breath, cough, loss of taste/smell, generalized body aches, persistent headache, chills, sore throat, nausea/vomiting/diarrhea)  Your doctor is:       Dr. Linwood Carmona, Orthopaedics: [phone]               Or dial [phone] and ask for the resident on call for:  Orthopaedics  For emergency care, call the:  [facility] [phone] (TTY for hearing impaired: [phone])                     Safety Tips for Using Crutches    Crutch Fit:  Assume good standing posture with shoulders relaxed and crutch tips 6-8 inches out from the side of the foot.  The underarm pad should fall 2-3 fingers width below the armpit.  The handgrip is positioned level with the wrist to allow 30  flexion at the elbow.    Safety Tips:  Bear weight on your hands, not on your armpits.  Do not add extra padding to the underarm pad. This will, in effect, lengthen the crutches and increase risk of nerve injury.  Wear flat, properly fitting shoes. Do not walk in stocking feet, high heels or slippers.  Household hazards:  --Throw rugs should be removed from floors.  --Stairs should be cleared of obstacles.  --Use extra caution on slippery, highly polished, littered or uneven floor surfaces.  --Check for  "electric cords.  Check crutch tips for excessive wear and keep wing nuts tight.  While walking, look forward with  head up  and  eyes open.  Take equal length steps.  Use BOTH crutches.    Stairs Sequence:  UP: \"Good\" leg first, followed by  bad  leg, then crutches.  DOWN: Crutches, followed by  bad  leg, \"good\" leg.     Walking with Crutches:  Non-Weight Bearing (NWB):  Hold the involved leg up and swing through the crutches with the involved leg. The involved leg does not touch the floor.  "

## 2024-06-26 NOTE — PROGRESS NOTES
I updated the patient's daughter immediately postoperatively with the professional [facility]  assisting.  All questions were answered.

## 2024-06-26 NOTE — ANESTHESIA PROCEDURE NOTES
Airway       Patient location during procedure: OR  Staff -        Anesthesiologist:  Kvng Rosenthal MD       CRNA: Holly Kwon APRN CRNA       Performed By: CRNA  Consent for Airway        Urgency: elective  Indications and Patient Condition       Indications for airway management: bernie-procedural       Induction type:intravenous       Mask difficulty assessment: 0 - not attempted    Final Airway Details       Final airway type: supraglottic airway    Supraglottic Airway Details        Type: LMA       Brand: LMA Unique       LMA size: 4    Post intubation assessment        Placement verified by: capnometry, equal breath sounds and chest rise        Number of attempts at approach: 1       Number of other approaches attempted: 0       Secured with: tape       Ease of procedure: easy       Dentition: Intact and Unchanged

## 2024-06-26 NOTE — ANESTHESIA CARE TRANSFER NOTE
Patient: Radha B Aj    Procedure: Procedure(s):  Open reduction, internal fixation of left ankle fracture       Diagnosis: Closed trimalleolar fracture of left ankle, initial encounter [S89.562A]  Diagnosis Additional Information: No value filed.    Anesthesia Type:   General     Note:    Oropharynx: oropharynx clear of all foreign objects and spontaneously breathing  Level of Consciousness: drowsy  Oxygen Supplementation: face mask  Level of Supplemental Oxygen (L/min / FiO2): 6  Independent Airway: airway patency satisfactory and stable  Dentition: dentition unchanged  Vital Signs Stable: post-procedure vital signs reviewed and stable  Report to RN Given: handoff report given  Patient transferred to: PACU    Handoff Report: Identifed the Patient, Identified the Reponsible Provider, Reviewed the pertinent medical history, Discussed the surgical course, Reviewed Intra-OP anesthesia mangement and issues during anesthesia, Set expectations for post-procedure period and Allowed opportunity for questions and acknowledgement of understanding      Vitals:  Vitals Value Taken Time   BP     Temp     Pulse 82 06/26/24 1529   Resp 17 06/26/24 1529   SpO2 100 % 06/26/24 1529   Vitals shown include unfiled device data.    Electronically Signed By: MAKENZIE Horne CRNA  June 26, 2024  3:30 PM

## 2024-06-26 NOTE — OR NURSING
Patient received left side Sciatic nerve block  with Exparel.  Fentanyl 50mcg given. Tolerated procedure well.

## 2024-06-27 NOTE — OP NOTE
DATE OF SURGERY:  06/26/2024.    PREOPERATIVE DIAGNOSIS:  Closed trimalleolar left ankle fracture.             POSTOPERATIVE DIAGNOSIS:  Closed trimalleolar left ankle fracture.    PROCEDURE:  Open reduction, internal fixation of left ankle.    PRIMARY SURGEON:  Linwood Carmona MD.    ASSISTANT SURGEON:  Parker Rose MD.    ANESTHESIA:  General laryngeal mask airway with left lower extremity sciatic block.    COMPLICATIONS:  None apparent intraoperatively or immediately postoperatively.    IMPLANTS:  Fibula:  One titanium Arthrex 5-hole left sided locking distal fibular plate with five 3.0 mm locking screws distally, and four 3.5 mm screws proximally (three locking and one cortical).  The 3.0 mm screws were inserted using a T10 hexalobe (star) , and the 3.5 mm screws were inserted using a T15 hexalobe (star) .  Medial Malleolus:  Two Arthrex titanium headed and partially threaded 4.0 mm cannulated screws, inserted using a T15 cannulated hexalobe (star) .  Syndesmosis:  One Arthrex syndesmotic TightRope device to syndesmosis.    SIGNIFICANT FINDINGS:  Intraoperative stress view in the mortise projection after fibular and medial malleolar fixation showed a lateral talar shift with medial widening despite a congruent mortise with the nonstress view.  After syndesmotic fixation this same stress test performed was negative for talar shift or medial widening.  Lateral view after medial malleolar, fibular, and syndesmotic fixation showed no posterior instability of the ankle joint or of the posterior malleolar fragment, obviating the need for posterior malleolar fixation.    SPECIMENS:  None.    DRAINS:  None.    ESTIMATED BLOOD LOSS:  25 mL.    TOURNIQUET TIME:  75 minutes at 250 mmHg.      INDICATIONS:  Radha ERICA Rocha is a 64-year-old patient with who sustained a closed displaced unstable trimalleolar left ankle fracture.  The patient was seen and examined preoperatively in clinic with an .   The recommendation was made to the patient for open reduction and internal fixation of this injury.  The injury diagnosis and prognosis, nature of the recommended procedure, the risks, benefits, and alternatives, the postoperative plan, and realistic expectations for short and long term outcome were all discussed in layman's terms.  No guarantees were expressed or implied as to outcome.  The expectation that there would likely always be some amount of ankle pain, stiffness, swelling, and/or weakness, whether minor or major, at or around the site of this injury was discussed.  The patient had the opportunity to have all questions at the time asked and answered appropriately, verbalized understanding of our discussion, and verbalized the wish to proceed with the planned procedure.  Written informed consent was obtained.    DESCRIPTION OF PROCEDURE:  The patient, Radha Rocha, was met in the preoperative area with an  present where the correct surgical site was identified with patient participation and marked by the primary surgeon.  All questions were answered.  The regional anesthesia team performed a sciatic block on the operative lower extremity.  The patient was then brought to the operating room and carefully positioned in the supine position on the operating room table with all bony prominences well padded and a safety strap across the torso.  General anesthesia was induced, and a laryngeal mask airway was placed.  The splint, which was clean, dry, and intact, was removed from the operative lower extremity.  The skin on the operative ankle was mildly and diffusely swollen, but intact without any open wounds, blisters, or abrasions.  All compartments were soft and easily compressible.  The skin over the planned operative sites did wrinkle.  The leg was carefully positioned so the toes were pointing upwards using a soft bump under the hip of the operative lower extremity.  Cefazolin was given IV per  protocol.  Venous thromboembolic prophylaxis was performed with a sequential compression device on the contralateral nonoperative lower extremity.  A tourniquet was placed on the thigh of the operative lower extremity.  The patient's operative lower extremity was then prepped and draped in the usual sterile fashion.  The toes and forefoot were prepped but covered with a sterile glove for the entire case.  Prior to proceeding with the operation, a timeout was held per hospital policy correctly identifying the patient, procedure to be performed, and operative site including laterality.  All in the room agreed.    The operative lower extremity was exsanguinated by gravity, and the tourniquet was inflated to 250 mm Hg.  A longitudinal incision was made over the distal fibula to allow appropriate exposure for ORIF of this fracture.  Careful dissection was performed deep to skin to avoid injury to the superficial peroneal nerve and peroneal tendons.  The periosteum over the fracture site was elevated, and interposed hematoma , scar tissue, and periosteum were removed from the fracture sites.  This was a relatively extensive process.  The distal fibula appeared to be comminuted and bone quality appeared to be relatively soft.  Hohmann retractors were carefully placed directly on bone anteriorly and posteriorly.  Given the comminution, soft appearing nature of the bone, and desire to avoid further fragmenting the fracture fragments, the fracture was deemed not amenable to lag screw fixation.  Using a combination of gentle traction, internal rotation, direct manual manipulation of the fracture fragments, and a reduction clamp, the fracture fragments were reduced into excellent alignment and provisionally held with manual pressure and a clamp, while confirming the alignment appeared very appropriate by direct visualization and palpation.  A 5-hole left distal fibular locking plate was applied to the distal fibula and  stabilized with two cortical screws in the diaphysis to lag the plate to bone and maintain reduction of the distal fragment.   The clamp was removed. Orthogonal C-arm views confirmed an excellent reduction of the fibula fracture, alignment of the ankle mortise, and position of the plate and first two screws.  The fibula was then further stabilized with a combination of multiple 3.0 mm locking screws distally and 3.5 mm locking screws proximally.  One of the two previous 3.5 mm cortical screws was replaced with a 3.5 mm locking screw.  Space in the plate at the level of the syndesmosis was reserved at this time in case syndesmotic fixation was later deemed necessary.  Orthogonal C-arm views confirmed maintenance of the excellent fibular and mortise reduction as well as appropriate position of all implants in the fibula.    Attention was now turned to the medial malleolus.  A longitudinal incision was made over the medial malleolus in its mid-sagittal aspect, with a #15 blade through skin only.  Careful dissection through the subcutaneous tissues was performed to avoid injury to the saphenous vein.  The periosteum over the fracture site was incised longitudinally and the fracture site was carefully and gently subperiosteally elevated with an elevator staying directly on bone.  Hohmann retractors were placed directly on bone anterior and posterior, with the posterior Hohmann placed deep to the intact posterior tibial tendon.  The tendon appeared intact and was not incarcerated in the fracture site.  The fracture site was gently distracted, and interposed scar tissue, periosteum, and hematoma was extracted.  The bone appeared quite soft in the medial malleolus as well, and careful manipulation was performed to avoid further fragmenting it.  The distal fragment had been displaced rather anteriorly and it was reduced very appropriately and confirmed with direct visualization and palpation along all surfaces.  Provisional  fixation was maintained with two guidepins for the 4.0 mm cannulated screw system, placed through the incision, while protecting the posterior tibial tendon and other structures at risk at all times.  The pins were drilled over and a 4.0 mm screw was placed over each pin.  The pins were removed.  Orthogonal C-arm views confirmed an excellent reduction of the medial malleolus, a congruent mortise without medial or syndesmotic widening, and excellent screw placement.    Attention was now turned to evaluation of the syndesmosis.  A manual stress mortise view with abduction and external rotation stress test of the ankle was carefully compared with a nonstress mortise view, and this evaluation showed medial clear space widening and a lateral shift with the stress view.  The syndesmosis was deemed to be unstable with the need for reduction and fixation.  A TightRope device was pre-drilled from posterolateral through the hole in the plate closest to the level of the syndesmosis, to anteromedial, through four cortices, followed by reduction of the syndesmosis with manual pressure and placement of a TightRope device.  The medial button was confirmed to be resting directly on bone as visualized through the medial incision.  The TightRope was cinched tight with the lateral button confirmed directly on the plate and the sutures were cut.  A stress test confirmed no dynamic widening of the medial clear space or syndesmosis, and no lateral shift of the talus.  One TightRope was determined to be appropriate.  Additional fixation in the distal shaft of the proximal fragment was now performed into one of the holes that had been previously saved for possible syndesmotic fixation, using an additional 3.5 mm locking screw.    Attention was now turned to evaluation of the posterior malleolus.  A soft bump was placed underneath the Achilles to allow the heel to float.  A nonstress lateral and a stress lateral with a posteriorly directed  force and plantarflexion were compared.  There was no evidence for any posterior ankle joint instability or for posterior malleolar fragment instability.  The posterior malleolus was deemed to be stable without need for fixation.    C-arm images in the AP, mortise, and lateral projections were all carefully reviewed appropriately visualize all hardware and fractures.  Reduction of all fractures appeared to remain excellent, the mortise remained congruent, and the position of all hardware was appropriate.    The tourniquet was deflated prior to wound closure at 75 minutes.  There was no evidence for any vascular injury.  Meticulous hemostasis was achieved.  The wounds were thoroughly irrigated.  The wounds were closed in layers.  0 Vicryl was used for the periosteum over the fracture sites and hardware, and 2-0 Vicryl was used for the subcutaneous tissues.  The deep tissues and subcutaneous tissues appeared to be rather friable and weak.  3-0 nylon was used for the skin.  The skin was carefully cleansed with sterile saline and dried, and the incisions were dressed sterilely with Adaptic, gauze bandages, ABDs, and sterile cast padding.  A very well-padded short leg splint was applied.  All surgical counts were reported as correct at the end of the case.  The patient was carefully transferred to her gurney and the LMA was removed.  She was then taken to the recovery room in stable condition.  I was present and scrubbed in for the entire case from start to finish.      POSTOPERATIVE PLAN:    Discharge home today once criteria met.  Diet:  Start with clear liquids and advance as tolerated.  Pain control:  Multimodal.  Wean off oral opioids to nonopioid analgesics.  Weightbearing status:  Nonweightbearing on the operative lower extremity for an anticipated 6 weeks.  Ice and elevate the operative lower extremity.  Float the operative heel off of the bed.  Immobilization:  Keep splint clean, dry, and intact for the next two  weeks until the first scheduled orthopaedic clinic appointment.  Dressings:  Dressings will be changed the first scheduled orthopaedic clinic appointment.  DVT prophylaxis:  mg po daily x 6 weeks.  PT:  At two weeks postop, self-directed exercises are anticipated consisting of circles, alphabet, and dorsiflexion stretching with a towel for 15-20 minutes tid as tolerated, with formal PT anticipated to start at 6 weeks for strengthening, home exercises, range of motion, edema control, and any modalities as indicated.  Follow up:  Two weeks postop in ortho clinic for dressing and splint removal, wound check, and possible suture removal if appropriate to do so at that time.  At that time, a removable walking boot (though not weightbearing yet) will be applied to the operative lower extremity that the patient may remove 15-20 minutes tid for the above noted exercises as well as hygiene and skin checks.  The next planned follow-up after that will be 6 weeks postop, at which time if clinically and radiographically appropriate to do so it is anticipated that weightbearing AP, mortise, and lateral radiographs of the operative ankle will be performed, weightbearing as tolerated will start in the boot, and formal PT will start.      Linwood Carmona MD  Attending surgeon  Orthopaedic Surgery

## 2024-07-08 ENCOUNTER — OFFICE VISIT (OUTPATIENT)
Dept: ORTHOPEDICS | Facility: CLINIC | Age: 64
End: 2024-07-08
Payer: COMMERCIAL

## 2024-07-08 DIAGNOSIS — S82.852A CLOSED TRIMALLEOLAR FRACTURE OF LEFT ANKLE, INITIAL ENCOUNTER: Primary | ICD-10-CM

## 2024-07-08 PROCEDURE — 99207 PR NO CHARGE NURSE ONLY: CPT

## 2024-07-08 NOTE — PROGRESS NOTES
Reason for visit:    Radha Rocha came in to the clinic for a two week post op check.    Her surgery was done 6/26/2024 by Dr Carmona.  She had a Left ankle ORIF.     Assessment:    Radha came into the clinic in a post op plaster splint Non-WB in a wheelchair. She is accompanied by a family member today. A [place]  was used via video on the  bot.    The Surgical wounds were exposed and found to be well-healed; so the sutures were removed. Skin was c/d/I. Steri strips were applied. Minimal to no swelling noted. Radha reports to be doing well.    Plan:     She was placed into a CAM boot.  She was told to remain Non-WB. She may remove the boot for hygiene and 3 times per day for range of motion exercises. These were instructed to her and she was given a print out of Dr Carmona's post op plan.     She has an appointment to see Dr. Carmona at 6 weeks post op and at that time Dr. Carmona will determine further restrictions.    All questions were answered. She has our phone number and will call with additional questions or problems.    Elisabeth Julien PA-C is the overseeing provider on site today.      DME FITTING    Relevant Diagnosis: Left ankle ORIF.  CAM boot was fit on patient's Left ankle.     Person(s) involved in teaching:   Daughter    Brace was applied in standard Manner:  Yes  Brace fit well:  Yes  Patient reports brace to fit comfortably:  Yes    Education:   Patient shown self application and removal of brace: Yes  Patient shown how to adjust brace fit, if necessary: Yes  Patient educated on billing and return policy: Yes  Patient confirmed understanding when and how to contact clinic with concerns: Yes

## 2024-08-01 DIAGNOSIS — S82.852A CLOSED TRIMALLEOLAR FRACTURE OF LEFT ANKLE: ICD-10-CM

## 2024-08-01 DIAGNOSIS — Z98.890 POST-OPERATIVE STATE: Primary | ICD-10-CM

## 2024-08-12 ENCOUNTER — OFFICE VISIT (OUTPATIENT)
Dept: ORTHOPEDICS | Facility: CLINIC | Age: 64
End: 2024-08-12
Payer: COMMERCIAL

## 2024-08-12 ENCOUNTER — ANCILLARY PROCEDURE (OUTPATIENT)
Dept: GENERAL RADIOLOGY | Facility: CLINIC | Age: 64
End: 2024-08-12
Attending: ORTHOPAEDIC SURGERY
Payer: COMMERCIAL

## 2024-08-12 DIAGNOSIS — Z98.890 POST-OPERATIVE STATE: ICD-10-CM

## 2024-08-12 DIAGNOSIS — S82.852D CLOSED TRIMALLEOLAR FRACTURE OF LEFT ANKLE WITH ROUTINE HEALING, SUBSEQUENT ENCOUNTER: Primary | ICD-10-CM

## 2024-08-12 DIAGNOSIS — S82.852A CLOSED TRIMALLEOLAR FRACTURE OF LEFT ANKLE: ICD-10-CM

## 2024-08-12 PROCEDURE — 73610 X-RAY EXAM OF ANKLE: CPT | Mod: LT | Performed by: RADIOLOGY

## 2024-08-12 PROCEDURE — 99024 POSTOP FOLLOW-UP VISIT: CPT | Performed by: ORTHOPAEDIC SURGERY

## 2024-08-12 PROCEDURE — T1013 SIGN LANG/ORAL INTERPRETER: HCPCS | Mod: U3

## 2024-08-12 NOTE — PROGRESS NOTES
Orthopaedic Surgery Clinic Follow-up Appointment    DOS:  ORIF L ankle 6/26/24 with Dr. Carmona    History:  I saw this pleasant 64 year old patient today in follow-up approximately 6 weeks s/p the above. She is accompanied by her daughter as well as an in-person [facility] . She reports she has been doing well. Only occasionally requires tylenol. Endorses swelling. Has been working on ankle ROM, has remained NWB. Denies any issues with her incision, drainage, new N/T.     Exam:  Examination today shows the patient be pleasant, cooperative, awake, and alert adult sitting upright in a regular chair in no acute distress.  She is nonseptic appearing from a general clinical standpoint.  Breathing pattern is regular and nonlabored on room air.    Well healed incisions about medial and lateral malleoli. Mild swelling about ankle. Non-TTP about medial and lateral malleoli. Tibiotalar ROM 3 degrees dorsiflexion to 30 degrees plantarflexion. Fires EHL/FHL/TA/GaSc/wiggles lesser toes. SILT DP/SP/T/Sa/Sommer nn. Toes WWP.    Imaging:  Independent review of imaging was performed including AP, oblique, and lateral left foot radiographs dated today, 8/12/24.  Images were discussed with and shown to the patient.  S/p ORIF R ankle, implants in stable alignment. Interval callus formation about lateral mal fx. Minimal callus formation about medial mal with slight sclerosis about fracture edges. Interval gapping of medial mal fracture as compared to intra-op images.     Impression:  64 year old patient with left bimalleolar ankle fracture, now 6 weeks status post the above procedure. Doing well clinically. Her imaging is notable for callus formation about lateral mal fracture but with slight gapping about the medial mal fx with minimal callus formation. As she is only 6 weeks out from surgery, she will likely continue to heal the medial mal fracture with time. She may progress to WBAT in her LLE.      Plan:  -Continue OTC pain  medications prn  - Progress to WBAT in the CAM, wean CAM after fully WB  - ROMAT L ankle, ROM exercises encoruaged  - F/up in 6 weeks with 3V XR L ankle    Loida Shaw MD  Orthopedic Surgery PGY-4    Yes

## 2024-08-12 NOTE — NURSING NOTE
Reason For Visit:   Chief Complaint   Patient presents with    RECHECK     DOS 6/26/24 // ORIF of Left ankle fracture.  She is here with in-person  and expresses that recovery has been going well, pain is improving, but she is still dealing with swelling of the left ankle.       There were no vitals taken for this visit.    Pain Assessment  Patient Currently in Pain: Yes  0-10 Pain Scale: 3  Primary Pain Location: Ankle    Faraz Jensen, ATC

## 2024-08-12 NOTE — LETTER
8/12/2024      Radha Rocha  [address]  [place] [address]      Dear Colleague,    Thank you for referring your patient, Radha Rocha, to the [facility] [place]. Please see a copy of my visit note below.    Orthopaedic Surgery Clinic Follow-up Appointment    DOS:  ORIF L ankle 6/26/24 with Dr. Carmona    History:  I saw this pleasant 64 year old patient today in follow-up approximately 6 weeks s/p the above. She is accompanied by her daughter as well as an in-person [facility] . She reports she has been doing well. Only occasionally requires tylenol. Endorses swelling. Has been working on ankle ROM, has remained NWB. Denies any issues with her incision, drainage, new N/T.     Exam:  Examination today shows the patient be pleasant, cooperative, awake, and alert adult sitting upright in a regular chair in no acute distress.  She is nonseptic appearing from a general clinical standpoint.  Breathing pattern is regular and nonlabored on room air.    Well healed incisions about medial and lateral malleoli. Mild swelling about ankle. Non-TTP about medial and lateral malleoli. Tibiotalar ROM 3 degrees dorsiflexion to 30 degrees plantarflexion. Fires EHL/FHL/TA/GaSc/wiggles lesser toes. SILT DP/SP/T/Sa/Sommer nn. Toes WWP.    Imaging:  Independent review of imaging was performed including AP, oblique, and lateral left foot radiographs dated today, 8/12/24.  Images were discussed with and shown to the patient.  S/p ORIF R ankle, implants in stable alignment. Interval callus formation about lateral mal fx. Minimal callus formation about medial mal with slight sclerosis about fracture edges. Interval gapping of medial mal fracture as compared to intra-op images.     Impression:  64 year old patient with left bimalleolar ankle fracture, now 6 weeks status post the above procedure. Doing well clinically. Her imaging is notable for callus formation about lateral mal fracture  but with slight gapping about the medial mal fx with minimal callus formation. As she is only 6 weeks out from surgery, she will likely continue to heal the medial mal fracture with time. She may progress to WBAT in her LLE.      Plan:  -Continue OTC pain medications prn  - Progress to WBAT in the CAM, wean CAM after fully WB  - ROMAT L ankle, ROM exercises encoruaged  - F/up in 6 weeks with 3V XR L ankle    Loida Shaw MD  Orthopedic Surgery PGY-4     Orthopaedic Surgery Clinic Follow-up Appointment    DOI:  06/10/2024, closed trimalleolar left ankle fracture.  Posterior malleolus stable by intraoperative stress testing.  Syndesmotic instability by intraoperative stress testing, stable after TightRope fixation.  DOS:  06/26/2024, ORIF L ankle.    Pleasant 65yo female patient s/p above. Seen and examined with professional [facility]  present as well as an adult family member. She reports she is doing well, removing her boot and doing exercises as instructed. Denies pain at this time.    Exam shows the left ankle surgical incisions to be well healed and dry. Skin intact. Nontender at medial and lateral malleoli. No obvious immediate threat to skin integrity, with hardware not overly prominent. Patient demonstrates good understanding of her ROM exercises.    Imaging reviewed including weightbearing AP, mortise, and lateral radiographs of the left ankle dated 08/12/2024.  Imaging discussed with and shown to patient and family, and compared with prior imaging.  Fracture alignment stable and well aligned.  Intact and stable appearing hardware in medial and lateral malleoli.  Stable appearing posterior malleolus fracture without interval displacement.  Ankle mortise congruent without medial clear space widening or syndesmotic widening.  Persistently visualized and perhaps slightly widened medial malleolar fracture line.    Impression:  64-year-old patient with closed left trimalleolar ankle fracture, 6 weeks s/p  ORIF of medial and lateral malleoli and syndesmosis, with stable posterior malleolus.    Plan:  Findings and treatment plan discussed with patient and family via the .  May now start to WBAT on L LE with boot on at all times when weightbearing.  Has requested to change to a short boot from her current tall boot.  Continue tid ROM exercises with boot removed for the exercises.  Follow-up 6 weeks with weightbearing L ankle radiographs.  Signs and symptoms that should prompt medical attention were discussed.  All questions were answered.      Again, thank you for allowing me to participate in the care of your patient.        Sincerely,        Linwood Carmona MD

## 2024-08-13 NOTE — PROGRESS NOTES
Orthopaedic Surgery Clinic Follow-up Appointment    DOI:  06/10/2024, closed trimalleolar left ankle fracture.  Posterior malleolus stable by intraoperative stress testing.  Syndesmotic instability by intraoperative stress testing, stable after TightRope fixation.  DOS:  06/26/2024, ORIF L ankle.    Pleasant 63yo female patient s/p above. Seen and examined with professional [facility]  present as well as an adult family member. She reports she is doing well, removing her boot and doing exercises as instructed. Denies pain at this time.    Exam shows the left ankle surgical incisions to be well healed and dry. Skin intact. Nontender at medial and lateral malleoli. No obvious immediate threat to skin integrity, with hardware not overly prominent. Patient demonstrates good understanding of her ROM exercises.    Imaging reviewed including weightbearing AP, mortise, and lateral radiographs of the left ankle dated 08/12/2024.  Imaging discussed with and shown to patient and family, and compared with prior imaging.  Fracture alignment stable and well aligned.  Intact and stable appearing hardware in medial and lateral malleoli.  Stable appearing posterior malleolus fracture without interval displacement.  Ankle mortise congruent without medial clear space widening or syndesmotic widening.  Persistently visualized and perhaps slightly widened medial malleolar fracture line.    Impression:  64-year-old patient with closed left trimalleolar ankle fracture, 6 weeks s/p ORIF of medial and lateral malleoli and syndesmosis, with stable posterior malleolus.    Plan:  Findings and treatment plan discussed with patient and family via the .  May now start to WBAT on L LE with boot on at all times when weightbearing.  Has requested to change to a short boot from her current tall boot.  Continue tid ROM exercises with boot removed for the exercises.  Follow-up 6 weeks with weightbearing L ankle radiographs.  Signs and  symptoms that should prompt medical attention were discussed.  All questions were answered.

## 2024-09-20 DIAGNOSIS — S82.852D CLOSED TRIMALLEOLAR FRACTURE OF LEFT ANKLE WITH ROUTINE HEALING, SUBSEQUENT ENCOUNTER: Primary | ICD-10-CM

## 2024-09-20 DIAGNOSIS — Z98.890 POST-OPERATIVE STATE: ICD-10-CM

## 2024-09-23 ENCOUNTER — ANCILLARY PROCEDURE (OUTPATIENT)
Dept: GENERAL RADIOLOGY | Facility: CLINIC | Age: 64
End: 2024-09-23
Attending: ORTHOPAEDIC SURGERY
Payer: COMMERCIAL

## 2024-09-23 ENCOUNTER — OFFICE VISIT (OUTPATIENT)
Dept: ORTHOPEDICS | Facility: CLINIC | Age: 64
End: 2024-09-23
Payer: COMMERCIAL

## 2024-09-23 DIAGNOSIS — Z98.890 POST-OPERATIVE STATE: ICD-10-CM

## 2024-09-23 DIAGNOSIS — S82.852G CLOSED TRIMALLEOLAR FRACTURE OF LEFT ANKLE WITH DELAYED HEALING, SUBSEQUENT ENCOUNTER: Primary | ICD-10-CM

## 2024-09-23 DIAGNOSIS — S82.852D CLOSED TRIMALLEOLAR FRACTURE OF LEFT ANKLE WITH ROUTINE HEALING, SUBSEQUENT ENCOUNTER: ICD-10-CM

## 2024-09-23 PROCEDURE — 73610 X-RAY EXAM OF ANKLE: CPT | Mod: LT | Performed by: RADIOLOGY

## 2024-09-23 PROCEDURE — 99024 POSTOP FOLLOW-UP VISIT: CPT | Performed by: ORTHOPAEDIC SURGERY

## 2024-09-23 NOTE — PROGRESS NOTES
Orthopaedic Surgery Clinic Follow-up Appointment    DOI:  06/10/2024, closed trimalleolar left ankle fracture.  Posterior malleolus stable by intraoperative stress testing.  Syndesmotic instability by intraoperative stress testing, stable after TightRope fixation.  DOS:  06/26/2024, ORIF L ankle.    History of Present Illness:    I saw and examined this pleasant 64-year-old patient for her left ankle.  She is seen and examined with professional [place]  assisting via video as well as an adult family member both present for the entire encounter.  She is now about 12 weeks postop, and reports she is doing very well, without concerns at this time.  She reports removing her boot and doing her left ankle exercises as instructed.  She denies pain at this time.  She presents weightbearing in a left medical walking boot.    Exam:  Exam shows the patient to be a pleasant, cooperative, awake, and alert adult sitting upright in a regular chair.  Left ankle walking boot, doffed for examination.  No other assistive gait devices seen.  Breathing pattern regular and nonlabored on room air.  Left ankle surgical incisions are well healed and dry without any overt signs for infection.  Skin is intact.  Nontender at both the lateral malleolus and syndesmosis; there is mild appearing tenderness at the anterior aspect of the medial malleolus.  No obvious immediate threat to skin integrity, with hardware not overly prominent.  3/4 easily palpable DP pulse.  Light touch sensation endorsed as intact in all dermatomes of the left foot.  Left TA, EHL, EDL, GS, FHL, FDL, PTT and peroneal strength is 5/5.    Imaging:  Independent review of imaging was performed including weightbearing AP, mortise, and lateral radiographs of the left ankle dated today, 09/23/2024.  Imaging was discussed with and shown to patient and her family member, and compared with prior radiographs.  Fractures remain stable and well aligned, with a stable and  congruent ankle mortise, though there is a persistently visualized medial malleolus fracture with a stable appearing small gap.  The lateral malleolar fracture appears to be progressively healing.  Stable appearing posterior malleolus fracture without interval displacement.  Ankle mortise congruent without medial clear space widening or syndesmotic widening.      Impression:  - Very pleasant 64-year-old patient with closed left trimalleolar ankle fracture, now approximately 12 weeks s/p ORIF of medial and lateral malleoli and syndesmosis, with stable posterior malleolus.  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    Plan:  - Findings and treatment plan discussed with patient and family via the  again.  - Continue WBAT on L LE with boot on at all times when weightbearing, but may remove when not WB.  - Bone stimulator will be prescribed for L ankle.  - Continue tid ROM exercises with boot removed for the exercises - personally demonstrated.  Patient demonstrated good understanding of these exercises at today's encounter.  - Follow-up 6 weeks with weightbearing L ankle radiographs.  If more healing at that time, anticipate discontinuing the boot and starting the TriLock ankle brace.   - All questions this very pleasant patient and her family member had at this time were answered.

## 2024-09-23 NOTE — LETTER
9/23/2024      Radha Rocha  [address]  [place] [address]      Dear Colleague,    Thank you for referring your patient, Radha Rocha, to the [facility] [place]. Please see a copy of my visit note below.    Orthopaedic Surgery Clinic Follow-up Appointment    DOI:  06/10/2024, closed trimalleolar left ankle fracture.  Posterior malleolus stable by intraoperative stress testing.  Syndesmotic instability by intraoperative stress testing, stable after TightRope fixation.  DOS:  06/26/2024, ORIF L ankle.    History of Present Illness:    I saw and examined this pleasant 64-year-old patient for her left ankle.  She is seen and examined with professional [place]  assisting via video as well as an adult family member both present for the entire encounter.  She is now about 12 weeks postop, and reports she is doing very well, without concerns at this time.  She reports removing her boot and doing her left ankle exercises as instructed.  She denies pain at this time.  She presents weightbearing in a left medical walking boot.    Exam:  Exam shows the patient to be a pleasant, cooperative, awake, and alert adult sitting upright in a regular chair.  Left ankle walking boot, doffed for examination.  No other assistive gait devices seen.  Breathing pattern regular and nonlabored on room air.  Left ankle surgical incisions are well healed and dry without any overt signs for infection.  Skin is intact.  Nontender at both the lateral malleolus and syndesmosis; there is mild appearing tenderness at the anterior aspect of the medial malleolus.  No obvious immediate threat to skin integrity, with hardware not overly prominent.  3/4 easily palpable DP pulse.  Light touch sensation endorsed as intact in all dermatomes of the left foot.  Left TA, EHL, EDL, GS, FHL, FDL, PTT and peroneal strength is 5/5.    Imaging:  Independent review of imaging was performed including  weightbearing AP, mortise, and lateral radiographs of the left ankle dated today, 09/23/2024.  Imaging was discussed with and shown to patient and her family member, and compared with prior radiographs.  Fractures remain stable and well aligned, with a stable and congruent ankle mortise, though there is a persistently visualized medial malleolus fracture with a stable appearing small gap.  The lateral malleolar fracture appears to be progressively healing.  Stable appearing posterior malleolus fracture without interval displacement.  Ankle mortise congruent without medial clear space widening or syndesmotic widening.      Impression:  - Very pleasant 64-year-old patient with closed left trimalleolar ankle fracture, now approximately 12 weeks s/p ORIF of medial and lateral malleoli and syndesmosis, with stable posterior malleolus.  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    Plan:  - Findings and treatment plan discussed with patient and family via the  again.  - Continue WBAT on L LE with boot on at all times when weightbearing, but may remove when not WB.  - Bone stimulator will be prescribed for L ankle.  - Continue tid ROM exercises with boot removed for the exercises - personally demonstrated.  Patient demonstrated good understanding of these exercises at today's encounter.  - Follow-up 6 weeks with weightbearing L ankle radiographs.  If more healing at that time, anticipate discontinuing the boot and starting the TriLock ankle brace.   - All questions this very pleasant patient and her family member had at this time were answered.      Again, thank you for allowing me to participate in the care of your patient.        Sincerely,        Linwood Carmona MD

## 2024-09-23 NOTE — NURSING NOTE
Reason For Visit:   Chief Complaint   Patient presents with    RECHECK     Patient returns 12w s/p ORIF left ankle fracture, DOS: 6/26/24.  She presents to clinic WBAT in tall cam boot.  She expresses today that she is doing very well and has no concerns right now.       There were no vitals taken for this visit.    Pain Assessment  Patient Currently in Pain: No    Faraz Jensen, ATC

## 2024-11-06 DIAGNOSIS — Z87.81 STATUS POST ORIF OF FRACTURE OF ANKLE: Primary | ICD-10-CM

## 2024-11-06 DIAGNOSIS — Z98.890 STATUS POST ORIF OF FRACTURE OF ANKLE: Primary | ICD-10-CM

## 2024-11-11 ENCOUNTER — ANCILLARY PROCEDURE (OUTPATIENT)
Dept: GENERAL RADIOLOGY | Facility: CLINIC | Age: 64
End: 2024-11-11
Attending: ORTHOPAEDIC SURGERY
Payer: COMMERCIAL

## 2024-11-11 ENCOUNTER — OFFICE VISIT (OUTPATIENT)
Dept: ORTHOPEDICS | Facility: CLINIC | Age: 64
End: 2024-11-11
Payer: COMMERCIAL

## 2024-11-11 DIAGNOSIS — Z98.890 STATUS POST ORIF OF FRACTURE OF ANKLE: ICD-10-CM

## 2024-11-11 DIAGNOSIS — S82.852G CLOSED TRIMALLEOLAR FRACTURE OF LEFT ANKLE WITH DELAYED HEALING, SUBSEQUENT ENCOUNTER: Primary | ICD-10-CM

## 2024-11-11 DIAGNOSIS — Z87.81 STATUS POST ORIF OF FRACTURE OF ANKLE: ICD-10-CM

## 2024-11-11 PROCEDURE — 99213 OFFICE O/P EST LOW 20 MIN: CPT | Performed by: ORTHOPAEDIC SURGERY

## 2024-11-11 PROCEDURE — 73610 X-RAY EXAM OF ANKLE: CPT | Mod: LT | Performed by: RADIOLOGY

## 2024-11-11 NOTE — PROGRESS NOTES
Orthopaedic Surgery Clinic Follow-up Appointment    Chief Complaint:  Follow-up left ankle fracture.    DOI:  06/10/2024, closed trimalleolar left ankle fracture.  Posterior malleolus stable by intraoperative stress testing.  Syndesmotic injury by intraoperative stress testing, stabilized with TightRope fixation.  DOS:  06/26/2024, ORIF L ankle.    History:  I saw this very pleasant 54-year-old patient today, accompanied by her adult daughter present for the entire encounter, in scheduled follow-up for the above injury.  She reports that she is feeling much better, and does not have much pain, though she does sometimes feel a cold sensation on the lateral aspect of her left ankle.  She denies any fevers, chills, or incisional drainage.  She reports doing her exercises as instructed.  She has been weightbearing in a boot.  She has also been using a bone stimulator prescribed at the last appointment for what appears to been somewhat of a delayed union of the medial malleolus.  She reports that this is going well.  She is now about 4-1/2 months postop.  She denies any pain at this visit.    Examination:  Examination today shows the patient to be a pleasant, cooperative, awake, and alert adult sitting upright in a regular chair in no acute distress.  She is accompanied by her adult daughter present for the entire encounter.  Breathing pattern is regular and nonlabored appearing on room air.  There is a hand brake walker with her.  Left lower extremity walking boot doffed for examination.  Skin on the left ankle and foot is intact without visible ulceration, blistering, or drainage.  The previous surgical incisions appear to be well-healed and dry.  There is minimal if any swelling present.  3/4 easily palpable left dorsalis pedis pulse with warm well-perfused left foot.  Motor strength is 5/5 for left tib ant, EHL, EDL, gastrocsoleus, FHL, FDL, PTT, and peroneals.  Sensation was previously endorsed as intact to light  touch in all dermatomes of the left foot at the last appointment.  The medial malleolus is nontender to palpation throughout, including the medial malleolar fracture site and area around the screw heads.  The posterior malleolar fracture site is nontender to palpation.  There is tenderness to palpation endorsed around the left distal fibula.  The hardware, while palpable, does not appear to be overly prominent or threatening skin.  Approximate left ankle range of motion appears to be good with ankle dorsiflexion to 15 degrees above neutral with the knee extended, composite plantarflexion 45, composite eversion 15, and composite inversion 30.    Imaging:  Independent review of imaging was performed including weightbearing AP, mortise, and lateral left ankle radiographs dated today, 11/11/2024.  Comparison is made with multiple sets of previous radiographs, and images are shown to discussed with the patient and her daughter.  The ankle mortise appears to be congruent without evidence for medial clear space widening or syndesmotic widening.  The trimalleolar ankle fracture appears to be well aligned and stable with intact appearing hardware.  The distal fibula portion of the fracture appears to be united, as does the posterior malleolar portion of the fracture.  The medial malleolar portion of the fracture is not united yet, and while stable appearing still appears to have somewhat of an anterior gap, though there does appear to be some evidence for interval healing over time.  No evidence for imminent hardware failure.    Impression:  Very pleasant 64-year-old patient with healing left trimalleolar ankle fracture with united appearing distal fibula and posterior malleolus, and ununited but partially healing medial malleolus.    Plan:  The findings and treatment plan were discussed with the patient and her daughter.  Continue range of motion exercises.  These were reviewed with the patient and her daughter.  Continue  use of bone stimulator for medial malleolar delayed union.  It is encouraging that this portion of her fracture, while ununited does appear to be nontender and x-rays appear to be stable.  She may now transition from the boot to a Tri-Lock ankle brace which is provided today with instructions for donning and doffing.  For short distances she may ambulate with normal shoewear without the brace, and use of brace for longer distances and as needed..  Follow-up in approximately 2 months with repeat weightbearing left ankle radiographs, or sooner for any problems, questions or concerns.  All questions this very pleasant patient and her daughter at this time were answered.

## 2024-11-11 NOTE — NURSING NOTE
Reason For Visit:   Chief Complaint   Patient presents with    RECHECK     Patient returns 4m 16d s/p ORIF left ankle fracture, DOS: 6/26/24.  Patient reports feeling much better.  She doesn't have much pain, but sometimes feels a cold sensation on the outside of her ankle.         There were no vitals taken for this visit.    Pain Assessment  Patient Currently in Pain: No    Faraz Jensen, ATC

## 2024-11-11 NOTE — LETTER
11/11/2024      Radha Rocha  [address]  [place] [address]      Dear Colleague,    Thank you for referring your patient, Radha Rocha, to the [facility] [place]. Please see a copy of my visit note below.    Orthopaedic Surgery Clinic Follow-up Appointment    Chief Complaint:  Follow-up left ankle fracture.    DOI:  06/10/2024, closed trimalleolar left ankle fracture.  Posterior malleolus stable by intraoperative stress testing.  Syndesmotic injury by intraoperative stress testing, stabilized with TightRope fixation.  DOS:  06/26/2024, ORIF L ankle.    History:  I saw this very pleasant 54-year-old patient today, accompanied by her adult daughter present for the entire encounter, in scheduled follow-up for the above injury.  She reports that she is feeling much better, and does not have much pain, though she does sometimes feel a cold sensation on the lateral aspect of her left ankle.  She denies any fevers, chills, or incisional drainage.  She reports doing her exercises as instructed.  She has been weightbearing in a boot.  She has also been using a bone stimulator prescribed at the last appointment for what appears to been somewhat of a delayed union of the medial malleolus.  She reports that this is going well.  She is now about 4-1/2 months postop.  She denies any pain at this visit.    Examination:  Examination today shows the patient to be a pleasant, cooperative, awake, and alert adult sitting upright in a regular chair in no acute distress.  She is accompanied by her adult daughter present for the entire encounter.  Breathing pattern is regular and nonlabored appearing on room air.  There is a hand brake walker with her.  Left lower extremity walking boot doffed for examination.  Skin on the left ankle and foot is intact without visible ulceration, blistering, or drainage.  The previous surgical incisions appear to be well-healed and dry.  There is  minimal if any swelling present.  3/4 easily palpable left dorsalis pedis pulse with warm well-perfused left foot.  Motor strength is 5/5 for left tib ant, EHL, EDL, gastrocsoleus, FHL, FDL, PTT, and peroneals.  Sensation was previously endorsed as intact to light touch in all dermatomes of the left foot at the last appointment.  The medial malleolus is nontender to palpation throughout, including the medial malleolar fracture site and area around the screw heads.  The posterior malleolar fracture site is nontender to palpation.  There is tenderness to palpation endorsed around the left distal fibula.  The hardware, while palpable, does not appear to be overly prominent or threatening skin.  Approximate left ankle range of motion appears to be good with ankle dorsiflexion to 15 degrees above neutral with the knee extended, composite plantarflexion 45, composite eversion 15, and composite inversion 30.    Imaging:  Independent review of imaging was performed including weightbearing AP, mortise, and lateral left ankle radiographs dated today, 11/11/2024.  Comparison is made with multiple sets of previous radiographs, and images are shown to discussed with the patient and her daughter.  The ankle mortise appears to be congruent without evidence for medial clear space widening or syndesmotic widening.  The trimalleolar ankle fracture appears to be well aligned and stable with intact appearing hardware.  The distal fibula portion of the fracture appears to be united, as does the posterior malleolar portion of the fracture.  The medial malleolar portion of the fracture is not united yet, and while stable appearing still appears to have somewhat of an anterior gap, though there does appear to be some evidence for interval healing over time.  No evidence for imminent hardware failure.    Impression:  Very pleasant 64-year-old patient with healing left trimalleolar ankle fracture with united appearing distal fibula and  posterior malleolus, and ununited but partially healing medial malleolus.    Plan:  The findings and treatment plan were discussed with the patient and her daughter.  Continue range of motion exercises.  These were reviewed with the patient and her daughter.  Continue use of bone stimulator for medial malleolar delayed union.  It is encouraging that this portion of her fracture, while ununited does appear to be nontender and x-rays appear to be stable.  She may now transition from the boot to a Tri-Lock ankle brace which is provided today with instructions for donning and doffing.  For short distances she may ambulate with normal shoewear without the brace, and use of brace for longer distances and as needed..  Follow-up in approximately 2 months with repeat weightbearing left ankle radiographs, or sooner for any problems, questions or concerns.  All questions this very pleasant patient and her daughter at this time were answered.      Again, thank you for allowing me to participate in the care of your patient.        Sincerely,        Linwood Carmona MD

## 2025-01-16 DIAGNOSIS — Z98.890 STATUS POST ORIF OF FRACTURE OF ANKLE: Primary | ICD-10-CM

## 2025-01-16 DIAGNOSIS — Z87.81 STATUS POST ORIF OF FRACTURE OF ANKLE: Primary | ICD-10-CM

## 2025-01-27 ENCOUNTER — OFFICE VISIT (OUTPATIENT)
Dept: ORTHOPEDICS | Facility: CLINIC | Age: 65
End: 2025-01-27
Payer: COMMERCIAL

## 2025-01-27 ENCOUNTER — ANCILLARY PROCEDURE (OUTPATIENT)
Dept: GENERAL RADIOLOGY | Facility: CLINIC | Age: 65
End: 2025-01-27
Attending: ORTHOPAEDIC SURGERY
Payer: COMMERCIAL

## 2025-01-27 DIAGNOSIS — S82.852G CLOSED TRIMALLEOLAR FRACTURE OF LEFT ANKLE WITH DELAYED HEALING, SUBSEQUENT ENCOUNTER: Primary | ICD-10-CM

## 2025-01-27 DIAGNOSIS — Z98.890 STATUS POST ORIF OF FRACTURE OF ANKLE: ICD-10-CM

## 2025-01-27 DIAGNOSIS — Z87.81 STATUS POST ORIF OF FRACTURE OF ANKLE: ICD-10-CM

## 2025-01-27 PROCEDURE — 73610 X-RAY EXAM OF ANKLE: CPT | Mod: LT | Performed by: RADIOLOGY

## 2025-01-27 PROCEDURE — 99213 OFFICE O/P EST LOW 20 MIN: CPT | Performed by: ORTHOPAEDIC SURGERY

## 2025-01-27 NOTE — NURSING NOTE
Reason For Visit:   Chief Complaint   Patient presents with    RECHECK     Patient returns 7m 1d s/p ORIF left ankle fracture.  She reports the left ankle is doing well today.  She states she is not in pain, but does have some questions for the surgeon today.       There were no vitals taken for this visit.    Pain Assessment  Patient Currently in Pain: Yes  0-10 Pain Scale: 3  Primary Pain Location: Ankle (left)    Faraz Jensen, ATC

## 2025-01-27 NOTE — LETTER
1/27/2025      Radha Rocha  [address]  [place] [address]      Dear Colleague,    Thank you for referring your patient, Radha Rocha, to the [facility] [place]. Please see a copy of my visit note below.    Orthopaedic Surgery Clinic Follow-up Appointment    DOI:  06/10/2024, closed trimalleolar left ankle fracture.  Posterior malleolus stable by intraoperative stress testing.  Syndesmotic injury by intraoperative stress testing, stabilized with TightRope fixation.  DOS:  06/26/2024, ORIF L ankle.    History:  I saw this very pleasant 65-year-old patient today in follow-up for a closed trimalleolar left ankle fracture.  She reports she is doing well and states that she is not in pain at the current time.  She reports she can have pain about 3 out of 10 in severity.  She denies any wound drainage.  She has been ambulating with a Tri-Lock ankle brace provided at the last visit.  She has questions about if she can do more exercises and activities at the gym.    Examination:  Examination today shows the patient to be a pleasant, cooperative, awake, and alert adult sitting upright in a regular chair in no acute distress.  She is companied by her adult daughter with her for the entire encounter.  Nonseptic appearing from a general clinical standpoint.  Nonlabored breathing pattern on room air.  The surgical incisions on both the medial and lateral aspects of the left ankle appear to be well-healed and dry without fluctuance, induration, drainage, dehiscence, or tenderness.  The fracture sites and areas around the hardware likewise appear to be nontender to palpation.  3/4 easily palpable left DP and PT pulses.  No significant swelling.  Excellent ankle range of motion with 10 degrees of dorsiflexion above neutral with knee extended, 45, plus a plantarflexion, 30 composite inversion, and 15 composite eversion.  There is crepitance with range of motion, which appears to  be present around the tip of the distal fibula which is mildly tender to palpation.  It is notable that this is well below the area of the fracture. The anterior ankle joint line is nontender to palpation.  5/5 motor strength left tib ant, EHL, EDL, gastrosoleus, FHL, FDL, PTT, and peroneals.  Light touch sensation is endorsed as intact in all dermatomes.    Imaging:  Independent review of imaging was performed including weightbearing AP, mortise, and lateral left ankle radiographs dated today, 1/27/2025.  Image discussed with and shown to the patient and her daughter, and compared with previous images from November 2024.  Compared with previous images, fracture alignment for all fractures appear to be stable without any loss of reduction.  Hardware likewise appears to be stable and intact without obvious hardware failure.  The incomplete union of the medial malleolar fracture appears to be significantly improved compared with the previous imaging, with interval increase in bone growth at the fracture site.  Ankle joint space is well-maintained.    Impression:  65-year-old patient doing well approximately 7 months 1 day status post ORIF of a closed left trimalleolar ankle fracture.    Plan:  She may discontinue the Tri-Lock brace but may also continue to use as needed for pain and or stability.  Continue activities as tolerated, weightbearing as tolerated on the left lower extremity with lower impact activity.  Appropriate activities were discussed.  Potential for future development of posttraumatic arthritis was discussed.  We did discuss the hardware and that in some patients it can be symptomatic enough that they request hardware removal.  I discussed that this is not absolutely mandatory, but is an option for her in the future should she wish to have it removed.  If so, I would recommend waiting till as close as possible to a year after her ORIF prior to hardware removal.  Follow-up in 3 months with  weightbearing radiographs of the left ankle.  All questions this very pleasant patient and her daughter at this time were answered.      Again, thank you for allowing me to participate in the care of your patient.        Sincerely,        Linwood Carmona MD    Electronically signed

## 2025-01-27 NOTE — PROGRESS NOTES
I saw this very pleasant 65-year-old patient today in follow-up for a closed trimalleolar left ankle fracture.  She reports she is doing well and states that she is not in pain at the current time.  She can have pain about 3 out of 10 in severity.  She denies any wound drainage.  She has been ambulating with a Tri-Lock ankle brace provided at the last visit.  She has questions about if she can do more exercises and activities at the gym.    Examination today shows the patient to be pleasant, cooperative, awake, and alert adult sitting upright in a regular chair in no acute distress.  She is companied by her adult daughter with her for the entire encounter.  Nonseptic appearing from a general clinical standpoint.  Nonlabored breathing pattern on room air.  Surgical incision of the left ankle appear to be well-healed and dry without fluctuance, induration, drainage, dehiscence, or tenderness.  The fracture sites and areas around the hardware likewise appear to be nontender to palpation.  3/4 easily palpable left DP and PT pulses.  No significant swelling.  Excellent ankle range of motion with 10 degrees of dorsiflexion above neutral with knee extended, 45, plus a plantarflexion, 30 composite inversion, and 15 complex eversion.  There is crepitance with range of motion, which appears to be present around the tip of the distal fibula which is mildly tender to palpation.  It is notable that this is well below the area of the fracture. The anterior ankle joint line is nontender to palpation.  5/5 motor strength left tib ant, EHL, EDL, gastrosoleus, FHL, FDL, PTT, and peroneals.  Light touch sensation is endorses intact in all dermatomes.    Independent review of images performed: Weightbearing AP, mortise, and lateral left ankle radiographs at today, 1/27/2025.  Image discussed with and shown to the patient and her daughter, and compared with previous images from November 2024.  Compared with previous images, fracture  alignment for all fractures appear to be stable without any loss of reduction.  Hardware likewise appears to be stable and intact without obvious hardware failure.  The incomplete union of the medial malleolar fracture appears to be significantly improved compared with the previous imaging, with interval increase in bone growth at the fracture site.  Ankle joint space is well-maintained.    Impression:  65-year-old patient doing well approximately 7 months 1 day status post ORIF of a closed left trimalleolar ankle fracture.    Plan:  She may discontinue the Tri-Lock brace but may also continue to use as needed for pain and or stability.  Continue activities as tolerated, weightbearing as tolerated on the left lower extremity with lower impact activity.  Appropriate activities were discussed.  Potential for future development of posttraumatic arthritis was discussed.  We did discuss the hardware and that in some patients it can be symptomatic enough that they request hardware removal.  I discussed that this is not absolutely mandatory, but is an option for her in the future should she wish to have it removed.  If so, I would recommend waiting till as close as possible to a year after her ORIF prior to hardware removal.  Follow-up in 3 months with weightbearing radiographs of the left ankle.  All questions this very pleasant patient and her daughter at this time were answered.

## 2025-03-04 ENCOUNTER — TELEPHONE (OUTPATIENT)
Dept: ORTHOPEDICS | Facility: CLINIC | Age: 65
End: 2025-03-04
Payer: COMMERCIAL

## 2025-03-04 NOTE — TELEPHONE ENCOUNTER
Left Voicemail (2nd Attempt) for the patient to call back and schedule the following:    Appointment type: Return Foot/Ankle  Provider:   Return date: r/s 4/14 appt to next available  MAX attempts made to reschedule. 4/14 appt will be cancelled

## 2025-06-05 DIAGNOSIS — Z87.81 STATUS POST ORIF OF FRACTURE OF ANKLE: Primary | ICD-10-CM

## 2025-06-05 DIAGNOSIS — Z98.890 STATUS POST ORIF OF FRACTURE OF ANKLE: Primary | ICD-10-CM

## 2025-06-20 ENCOUNTER — OFFICE VISIT (OUTPATIENT)
Dept: ORTHOPEDICS | Facility: CLINIC | Age: 65
End: 2025-06-20
Payer: COMMERCIAL

## 2025-06-20 ENCOUNTER — ANCILLARY PROCEDURE (OUTPATIENT)
Dept: GENERAL RADIOLOGY | Facility: CLINIC | Age: 65
End: 2025-06-20
Attending: ORTHOPAEDIC SURGERY
Payer: COMMERCIAL

## 2025-06-20 DIAGNOSIS — Z87.81 STATUS POST ORIF OF FRACTURE OF ANKLE: ICD-10-CM

## 2025-06-20 DIAGNOSIS — Z98.890 STATUS POST ORIF OF FRACTURE OF ANKLE: ICD-10-CM

## 2025-06-20 DIAGNOSIS — Z87.81 STATUS POST ORIF OF FRACTURE OF ANKLE: Primary | ICD-10-CM

## 2025-06-20 DIAGNOSIS — Z98.890 STATUS POST ORIF OF FRACTURE OF ANKLE: Primary | ICD-10-CM

## 2025-06-20 PROCEDURE — T1013 SIGN LANG/ORAL INTERPRETER: HCPCS

## 2025-06-20 PROCEDURE — 73610 X-RAY EXAM OF ANKLE: CPT | Mod: LT | Performed by: RADIOLOGY

## 2025-06-20 PROCEDURE — 99214 OFFICE O/P EST MOD 30 MIN: CPT | Performed by: ORTHOPAEDIC SURGERY

## 2025-06-20 RX ORDER — GABAPENTIN 100 MG/1
100 CAPSULE ORAL 3 TIMES DAILY
Qty: 90 CAPSULE | Refills: 0 | Status: SHIPPED | OUTPATIENT
Start: 2025-06-20 | End: 2025-07-20

## 2025-06-20 RX ORDER — LIDOCAINE 50 MG/G
1 PATCH TOPICAL EVERY 24 HOURS
Qty: 14 PATCH | Refills: 1 | Status: SHIPPED | OUTPATIENT
Start: 2025-06-20

## 2025-06-20 NOTE — LETTER
6/20/2025      Radha Rocha  [address]  [place] [address]      Dear Colleague,    Thank you for referring your patient, Radha Rocha, to the [facility] [place]. Please see a copy of my visit note below.    Orthopaedic Surgery Clinic Follow-up Appointment    DOI:  06/10/2024 Closed trimalleolar left ankle fracture.  DOS:  06/26/2024 ORIF L ankle.  Posterior malleolus stable by intraoperative stress testing.  Syndesmosis unstable by intraoperative stress testing, stabilized with TightRope fixation.    History:  I had the opportunity see this pleasant 65-year-old patient today in follow-up for her closed trimalleolar left ankle fracture dislocation.  The patient reports that the left ankle itself is actually feeling fine, but reports throbbing pain throughout the entire left lower extremity.  When asked if it occurs after a certain distance after starting to walk, she reports that it is after she has done walking and comes back home.  She has taken over-the-counter pain medications for the pain.  She reports it is has been present ever since the day of her surgery.  She reports that it is getting worse with time.  She does note a history of previous left knee arthritis for which she has had cortisone injections in the past that helped for about 3 months.  She reports the pain is aggravated by walking and alleviated by elevation.  She reports the pain kept her from sleeping last night.  She rates her pain as about 5 out of 10 in severity.  History is obtained from interview with the patient and review of the chart.    Exam:  Examination today shows the patient to be a pleasant, cooperative, awake, and alert adult sitting upright in a regular chair in no acute distress.  She is accompanied in the room by an adult family member as well as a professional [facility]  present for the entire encounter.  Breathing pattern is regular and nonlabored on room air.  Skin  is intact throughout the entire left knee, leg, ankle, and foot.  Well-healed surgical scars over the left ankle, dry, without bernie-incisional tenderness, drainage, fluctuance, or dehiscence.  The areas around the hardware in the medial and lateral aspects of the ankle are nontender to palpation.  The medial, lateral, and posterior malleolar fracture sites are nontender to palpation.  The syndesmosis and anterior ankle joint line are nontender to palpation.  3/4 easily palpable left DP and PT pulses.  Light touch sensation is endorsed as intact in all dermatomes of left foot, and motor strength is 5/5 for left tib ant, EHL, EDL, gastroc/soleus, FHL, FDL, PTT, and peroneals.  The left knee is nontender to palpation at the medial joint line.  Straight leg raise appears to be negative for radicular symptoms.    Imaging:  Independent review of imaging was performed including weightbearing AP, mortise, and lateral left ankle radiographs dated today, 6/20/2025.  Imaging was discussed with and shown to the patient and her family member, and compared with previous radiographs.  Stable and congruent appearing left ankle mortise without medial clear space widening or syndesmotic widening.  Healed medial, lateral, and posterior malleolar fractures.  Intact and stable appearing hardware.  No evidence for hardware failure.  Maintained ankle joint space.  Previous left knee radiographs from 2021 did show some arthritis present.  Previous left tib-fib radiographs from the time of her ankle injury showed no acute left knee or left tib-fib findings proximal to the level of the left ankle fracture.    Impression:  65-year-old patient with suspected nerve symptoms in left lower extremity, with healed trimalleolar left ankle fracture dislocation.  Left knee arthritis.    Plan:  Findings and treatment plan were discussed with the patient and her family member in layman's terms with the .  Continue weight-bearing as tolerated  on the left lower extremity.  Compression sleeve to left lower extremity.  Physical therapy for desensitization and modalities.  Diclofenac gel.  Lidocaine patches.  Gabapentin.  Referral to anesthesia clinic.  Follow-up with me in 6 weeks with weightbearing left knee radiographs.  All questions this pleasant patient and her family member had at this time were answered.    Disclaimer:  This note consists of symbols derived from keyboarding, dictation, and/or voice recognition software. As a result, although I do diligently check for accuracy, there may be errors in the script that have gone undetected. Please consider this when interpreting information found in this chart.    Again, thank you for allowing me to participate in the care of your patient.        Sincerely,        Linwood Carmona MD    Electronically signed

## 2025-06-20 NOTE — PROGRESS NOTES
Orthopaedic Surgery Clinic Follow-up Appointment    DOI:  06/10/2024 Closed trimalleolar left ankle fracture.  DOS:  06/26/2024 ORIF L ankle.  Posterior malleolus stable by intraoperative stress testing.  Syndesmosis unstable by intraoperative stress testing, stabilized with TightRope fixation.    History:  I had the opportunity see this pleasant 65-year-old patient today in follow-up for her closed trimalleolar left ankle fracture dislocation.  The patient reports that the left ankle itself is actually feeling fine, but reports throbbing pain throughout the entire left lower extremity.  When asked if it occurs after a certain distance after starting to walk, she reports that it is after she has done walking and comes back home.  She has taken over-the-counter pain medications for the pain.  She reports it is has been present ever since the day of her surgery.  She reports that it is getting worse with time.  She does note a history of previous left knee arthritis for which she has had cortisone injections in the past that helped for about 3 months.  She reports the pain is aggravated by walking and alleviated by elevation.  She reports the pain kept her from sleeping last night.  She rates her pain as about 5 out of 10 in severity.  History is obtained from interview with the patient and review of the chart.    Exam:  Examination today shows the patient to be a pleasant, cooperative, awake, and alert adult sitting upright in a regular chair in no acute distress.  She is accompanied in the room by an adult family member as well as a professional [facility]  present for the entire encounter.  Breathing pattern is regular and nonlabored on room air.  Skin is intact throughout the entire left knee, leg, ankle, and foot.  Well-healed surgical scars over the left ankle, dry, without bernie-incisional tenderness, drainage, fluctuance, or dehiscence.  The areas around the hardware in the medial and lateral aspects of  the ankle are nontender to palpation.  The medial, lateral, and posterior malleolar fracture sites are nontender to palpation.  The syndesmosis and anterior ankle joint line are nontender to palpation.  3/4 easily palpable left DP and PT pulses.  Light touch sensation is endorsed as intact in all dermatomes of left foot, and motor strength is 5/5 for left tib ant, EHL, EDL, gastroc/soleus, FHL, FDL, PTT, and peroneals.  The left knee is nontender to palpation at the medial joint line.  Straight leg raise appears to be negative for radicular symptoms.    Imaging:  Independent review of imaging was performed including weightbearing AP, mortise, and lateral left ankle radiographs dated today, 6/20/2025.  Imaging was discussed with and shown to the patient and her family member, and compared with previous radiographs.  Stable and congruent appearing left ankle mortise without medial clear space widening or syndesmotic widening.  Healed medial, lateral, and posterior malleolar fractures.  Intact and stable appearing hardware.  No evidence for hardware failure.  Maintained ankle joint space.  Previous left knee radiographs from 2021 did show some arthritis present.  Previous left tib-fib radiographs from the time of her ankle injury showed no acute left knee or left tib-fib findings proximal to the level of the left ankle fracture.    Impression:  65-year-old patient with suspected nerve symptoms in left lower extremity, with healed trimalleolar left ankle fracture dislocation.  Left knee arthritis.    Plan:  Findings and treatment plan were discussed with the patient and her family member in layman's terms with the .  Continue weight-bearing as tolerated on the left lower extremity.  Compression sleeve to left lower extremity.  Physical therapy for desensitization and modalities.  Diclofenac gel.  Lidocaine patches.  Gabapentin.  Referral to anesthesia clinic.  Follow-up with me in 6 weeks with weightbearing  left knee radiographs.  All questions this pleasant patient and her family member had at this time were answered.    Disclaimer:  This note consists of symbols derived from keyboarding, dictation, and/or voice recognition software. As a result, although I do diligently check for accuracy, there may be errors in the script that have gone undetected. Please consider this when interpreting information found in this chart.

## 2025-06-20 NOTE — NURSING NOTE
"Reason For Visit:   Chief Complaint   Patient presents with    RECHECK     1 year follow up of Open reduction, internal fixation of left ankle fracture - Left  DOS: 6/26/2025  She could not sleep due to the pain in the entire left leg, she takes OTC pain medications when the pain is present and elevating helps the pain. Walking will aggravate the leg pain. No physical therapy has occurred. She reports having a \"biting\" sensation at the anterior ankle.       There were no vitals taken for this visit.    Pain Assessment  Patient Currently in Pain: Yes  Patient's Stated Pain Goal: 5  Primary Pain Location: Ankle (Left)  Pain Descriptors: Constant    Roxie Cole, ATC    "

## 2025-06-22 PROBLEM — Z87.81 STATUS POST ORIF OF FRACTURE OF ANKLE: Status: ACTIVE | Noted: 2025-06-22

## 2025-06-22 PROBLEM — Z98.890 STATUS POST ORIF OF FRACTURE OF ANKLE: Status: ACTIVE | Noted: 2025-06-22

## 2025-07-17 DIAGNOSIS — Z87.81 STATUS POST ORIF OF FRACTURE OF ANKLE: Primary | ICD-10-CM

## 2025-07-17 DIAGNOSIS — Z98.890 STATUS POST ORIF OF FRACTURE OF ANKLE: Primary | ICD-10-CM

## 2025-07-17 DIAGNOSIS — S82.852G CLOSED TRIMALLEOLAR FRACTURE OF LEFT ANKLE WITH DELAYED HEALING, SUBSEQUENT ENCOUNTER: ICD-10-CM

## 2025-07-25 ENCOUNTER — ANCILLARY PROCEDURE (OUTPATIENT)
Dept: GENERAL RADIOLOGY | Facility: CLINIC | Age: 65
End: 2025-07-25
Attending: ORTHOPAEDIC SURGERY
Payer: COMMERCIAL

## 2025-07-25 ENCOUNTER — OFFICE VISIT (OUTPATIENT)
Dept: ORTHOPEDICS | Facility: CLINIC | Age: 65
End: 2025-07-25
Payer: COMMERCIAL

## 2025-07-25 DIAGNOSIS — Z87.81 STATUS POST ORIF OF FRACTURE OF ANKLE: ICD-10-CM

## 2025-07-25 DIAGNOSIS — S82.852G CLOSED TRIMALLEOLAR FRACTURE OF LEFT ANKLE WITH DELAYED HEALING, SUBSEQUENT ENCOUNTER: ICD-10-CM

## 2025-07-25 DIAGNOSIS — Z87.81 STATUS POST ORIF OF FRACTURE OF ANKLE: Primary | ICD-10-CM

## 2025-07-25 DIAGNOSIS — Z98.890 STATUS POST ORIF OF FRACTURE OF ANKLE: Primary | ICD-10-CM

## 2025-07-25 DIAGNOSIS — Z98.890 STATUS POST ORIF OF FRACTURE OF ANKLE: ICD-10-CM

## 2025-07-25 PROCEDURE — 99214 OFFICE O/P EST MOD 30 MIN: CPT | Performed by: ORTHOPAEDIC SURGERY

## 2025-07-25 PROCEDURE — 73610 X-RAY EXAM OF ANKLE: CPT | Mod: LT | Performed by: RADIOLOGY

## 2025-07-25 NOTE — NURSING NOTE
Reason For Visit:   Chief Complaint   Patient presents with    RECHECK     Status post ORIF of fracture of ankle       There were no vitals taken for this visit.    Pain Assessment  Patient Currently in Pain: Yes  Primary Pain Location: Ankle (Left - pain level is a 4, pt notes pain in the left lower extremity)    Alexandre Kim CMA

## 2025-07-25 NOTE — PROGRESS NOTES
Orthopaedic Surgery Clinic Follow-up Appointment    Chief Complaint:  Follow-up left ankle.  DOI:   06/10/2024, closed trimalleolar left ankle fracture.  DOS:   06/26/2024, ORIF L ankle.  Posterior malleolus stable by intraoperative stress testing.  Syndesmotic injury by intraoperative stress testing, stabilized with TightRope fixation.    History:  I saw this very pleasant 65-year-old patient today in follow-up for her left lower extremity and nerve pain.  She underwent ORIF of a closed trimalleolar left ankle fracture about 13 months ago.  She denies any fevers, chills, chest pain, shortness of breath, or incisional drainage.  She reports her ongoing complaint is diffuse pain about the left lower leg in a nondermatomal pattern between the knee and the ankle.  She reports that the gabapentin and the diclofenac gel do appear to be helping.  She rates her pain as 4 out of 10 in severity.  She states this is pain in the left lower extremity.    Exam:  Examination shows the patient to be a pleasant, cooperative, awake, and alert adult sitting upright in regular chair in no acute distress.  She is accompanied by her adult daughter present for the entire encounter.  Nonseptic appearing from a general clinical standpoint.  Breathing pattern appears to be regular and nonlabored on room air.  No assistive gait devices or bracing is seen.  Normal shoewear, doffed for examination on the left.  The ankle incisions appear to be well-healed and dry.  She endorses tenderness to palpation throughout the left lower leg and ankle.  These do include the areas around the medial and lateral malleoli.  There does appear to be some tenderness to palpation at the screw heads at the tip of the medial malleolus, and to a lesser extent along the distal fibular plate and screws.  She also endorses tenderness to palpation at the anterior ankle joint line.  Approximate passive range of motion appears to be 10 degrees of ankle dorsiflexion above  neutral with the knee extended, composite plantarflexion 45.  Motor strength is 5/5 for left tib ant, EHL, EDL, gastrosoleus, FHL, FDL, PTT, and peroneals.    Imaging:  Independent review of imaging was performed including weightbearing AP, mortise, and lateral left ankle radiographs dated today, 7/25/2025.  Imaging was discussed with and shown to the patient and her daughter and compared with previous imaging.  Stable and congruent appearing ankle mortise without medial clear space widening or syndesmotic widening.  Stable alignment of fractures.  The fractures do appear to be radiographically united, though a cleft which appears to be a residual portion of the distal fibular fracture still visible on the medial aspect.  Stable and intact appearing hardware.  Well-maintained ankle joint space.  No obvious evidence for hardware loosening or failure.    Impression:  65-year-old patient with:  Healed closed left trimalleolar ankle fracture and syndesmotic injury.  Suspect left lower extremity nerve pain.    Plan:  Continue weightbearing as tolerated on left lower extremity.  Continue gabapentin and diclofenac gel as needed.  I discussed with her that I would be happy to refill these as needed.  The patient does have compression stockings but reports that they are too high and too tight.  A new pair compression stockings for 15 to 20 mmHg, knee-high, is prescribed today.  The options for hardware removal in the future were discussed.  The risks, benefits, and alternatives were discussed.  The patient would like to think about this and consider this possibly for the future.  Follow-up in 3 months with repeat weightbearing left ankle radiographs, or sooner for any problems, questions or concerns.  She is instructed to follow-up sooner for increased pain.  All questions were answered.

## 2025-07-25 NOTE — LETTER
7/25/2025      Radha Rocha  [address]  [place] [address]      Dear Colleague,    Thank you for referring your patient, Radha Rocha, to the [facility] [place]. Please see a copy of my visit note below.    Orthopaedic Surgery Clinic Follow-up Appointment    Chief Complaint:  Follow-up left ankle.  DOI:   06/10/2024, closed trimalleolar left ankle fracture.  DOS:   06/26/2024, ORIF L ankle.  Posterior malleolus stable by intraoperative stress testing.  Syndesmotic injury by intraoperative stress testing, stabilized with TightRope fixation.    History:  I saw this very pleasant 65-year-old patient today in follow-up for her left lower extremity and nerve pain.  She underwent ORIF of a closed trimalleolar left ankle fracture about 13 months ago.  She denies any fevers, chills, chest pain, shortness of breath, or incisional drainage.  She reports her ongoing complaint is diffuse pain about the left lower leg in a nondermatomal pattern between the knee and the ankle.  She reports that the gabapentin and the diclofenac gel do appear to be helping.  She rates her pain as 4 out of 10 in severity.  She states this is pain in the left lower extremity.    Exam:  Examination shows the patient to be a pleasant, cooperative, awake, and alert adult sitting upright in regular chair in no acute distress.  She is accompanied by her adult daughter present for the entire encounter.  Nonseptic appearing from a general clinical standpoint.  Breathing pattern appears to be regular and nonlabored on room air.  No assistive gait devices or bracing is seen.  Normal shoewear, doffed for examination on the left.  The ankle incisions appear to be well-healed and dry.  She endorses tenderness to palpation throughout the left lower leg and ankle.  These do include the areas around the medial and lateral malleoli.  There does appear to be some tenderness to palpation at the screw heads at the  tip of the medial malleolus, and to a lesser extent along the distal fibular plate and screws.  She also endorses tenderness to palpation at the anterior ankle joint line.  Approximate passive range of motion appears to be 10 degrees of ankle dorsiflexion above neutral with the knee extended, composite plantarflexion 45.  Motor strength is 5/5 for left tib ant, EHL, EDL, gastrosoleus, FHL, FDL, PTT, and peroneals.    Imaging:  Independent review of imaging was performed including weightbearing AP, mortise, and lateral left ankle radiographs dated today, 7/25/2025.  Imaging was discussed with and shown to the patient and her daughter and compared with previous imaging.  Stable and congruent appearing ankle mortise without medial clear space widening or syndesmotic widening.  Stable alignment of fractures.  The fractures do appear to be radiographically united, though a cleft which appears to be a residual portion of the distal fibular fracture still visible on the medial aspect.  Stable and intact appearing hardware.  Well-maintained ankle joint space.  No obvious evidence for hardware loosening or failure.    Impression:  65-year-old patient with:  Healed closed left trimalleolar ankle fracture and syndesmotic injury.  Suspect left lower extremity nerve pain.    Plan:  Continue weightbearing as tolerated on left lower extremity.  Continue gabapentin and diclofenac gel as needed.  I discussed with her that I would be happy to refill these as needed.  The patient does have compression stockings but reports that they are too high and too tight.  A new pair compression stockings for 15 to 20 mmHg, knee-high, is prescribed today.  The options for hardware removal in the future were discussed.  The risks, benefits, and alternatives were discussed.  The patient would like to think about this and consider this possibly for the future.  Follow-up in 3 months with repeat weightbearing left ankle radiographs, or sooner for any  problems, questions or concerns.  She is instructed to follow-up sooner for increased pain.  All questions were answered.    Again, thank you for allowing me to participate in the care of your patient.        Sincerely,        Linwood Carmona MD    Electronically signed

## (undated) DEVICE — GOWN XLG DISP 9545

## (undated) DEVICE — SU VICRYL 0 CT-2 27" J334H

## (undated) DEVICE — SU VICRYL 2-0 CT-2 27" UND J269H

## (undated) DEVICE — LINEN ORTHO PACK 5446

## (undated) DEVICE — SU ETHILON 3-0 PS-1 18" 1663H

## (undated) DEVICE — GLOVE BIOGEL PI MICRO INDICATOR UNDERGLOVE SZ 8.0 48980

## (undated) DEVICE — DRILL BIT ARTHREX 2.5MM AR-8943-30

## (undated) DEVICE — SOL NACL 0.9% IRRIG 500ML BOTTLE 2F7123

## (undated) DEVICE — DRAPE C-ARM W/STRAPS 42X72" 07-CA104

## (undated) DEVICE — DRAPE C-ARMOR 5 SIDED 5523

## (undated) DEVICE — PACK LOWER EXTREMITY CUSTOM ASC

## (undated) DEVICE — DRAPE STOCKINETTE 4" 8544

## (undated) DEVICE — SUCTION MANIFOLD NEPTUNE 2 SYS 1 PORT 702-025-000

## (undated) DEVICE — ESU GROUND PAD ADULT W/CORD E7507

## (undated) DEVICE — TOURNIQUET SGL  BLADDER 30"X4" BLUE 5921030135

## (undated) DEVICE — GLOVE BIOGEL PI SZ 8.0 40880

## (undated) DEVICE — CAST PADDING 4" UNSTERILE 9044

## (undated) DEVICE — CAST PADDING 4" STERILE 9044S

## (undated) DEVICE — LINEN GOWN XLG 5407

## (undated) DEVICE — DRSG ABDOMINAL 07 1/2X8" 7197D

## (undated) DEVICE — DRILL BIT ARTHREX CAN 2.6MM AR-8943-02

## (undated) DEVICE — GUIDE WIRE THREADED TROCAR TIP 1.35MM AR-8943-38

## (undated) DEVICE — BLADE KNIFE SURG 15 371115

## (undated) DEVICE — PREP CHLORAPREP 26ML TINTED HI-LITE ORANGE 930815

## (undated) DEVICE — Device

## (undated) DEVICE — DRSG GAUZE 4X4" TRAY 6939

## (undated) DEVICE — DRILL BIT ARTHREX 2.0MM AR-8943-16

## (undated) DEVICE — CAST PLASTER SPLINT 5X30" 7395

## (undated) DEVICE — BNDG ESMARK 4" STERILE LF 820-3412

## (undated) RX ORDER — FENTANYL CITRATE 50 UG/ML
INJECTION, SOLUTION INTRAMUSCULAR; INTRAVENOUS
Status: DISPENSED
Start: 2024-06-26

## (undated) RX ORDER — TRANEXAMIC ACID 100 MG/ML
INJECTION, SOLUTION INTRAVENOUS
Status: DISPENSED
Start: 2024-06-26

## (undated) RX ORDER — SIMETHICONE 40MG/0.6ML
SUSPENSION, DROPS(FINAL DOSAGE FORM)(ML) ORAL
Status: DISPENSED
Start: 2024-06-27

## (undated) RX ORDER — TRIAMCINOLONE ACETONIDE 40 MG/ML
INJECTION, SUSPENSION INTRA-ARTICULAR; INTRAMUSCULAR
Status: DISPENSED
Start: 2021-12-07

## (undated) RX ORDER — ACETAMINOPHEN 325 MG/1
TABLET ORAL
Status: DISPENSED
Start: 2024-06-26

## (undated) RX ORDER — CELECOXIB 200 MG/1
CAPSULE ORAL
Status: DISPENSED
Start: 2024-06-26

## (undated) RX ORDER — PROPOFOL 10 MG/ML
INJECTION, EMULSION INTRAVENOUS
Status: DISPENSED
Start: 2024-06-26

## (undated) RX ORDER — OXYCODONE HYDROCHLORIDE 5 MG/1
TABLET ORAL
Status: DISPENSED
Start: 2024-06-26

## (undated) RX ORDER — DEXAMETHASONE SODIUM PHOSPHATE 4 MG/ML
INJECTION, SOLUTION INTRA-ARTICULAR; INTRALESIONAL; INTRAMUSCULAR; INTRAVENOUS; SOFT TISSUE
Status: DISPENSED
Start: 2024-06-26

## (undated) RX ORDER — LIDOCAINE HYDROCHLORIDE 10 MG/ML
INJECTION, SOLUTION EPIDURAL; INFILTRATION; INTRACAUDAL; PERINEURAL
Status: DISPENSED
Start: 2021-12-07

## (undated) RX ORDER — CEFAZOLIN SODIUM 2 G/50ML
SOLUTION INTRAVENOUS
Status: DISPENSED
Start: 2024-06-26

## (undated) RX ORDER — LIDOCAINE HYDROCHLORIDE 10 MG/ML
INJECTION, SOLUTION EPIDURAL; INFILTRATION; INTRACAUDAL; PERINEURAL
Status: DISPENSED
Start: 2022-05-24

## (undated) RX ORDER — SIMETHICONE 40MG/0.6ML
SUSPENSION, DROPS(FINAL DOSAGE FORM)(ML) ORAL
Status: DISPENSED
Start: 2024-06-28

## (undated) RX ORDER — LIDOCAINE HYDROCHLORIDE 10 MG/ML
INJECTION, SOLUTION EPIDURAL; INFILTRATION; INTRACAUDAL; PERINEURAL
Status: DISPENSED
Start: 2022-12-19

## (undated) RX ORDER — LABETALOL HYDROCHLORIDE 5 MG/ML
INJECTION, SOLUTION INTRAVENOUS
Status: DISPENSED
Start: 2024-06-26

## (undated) RX ORDER — TRIAMCINOLONE ACETONIDE 40 MG/ML
INJECTION, SUSPENSION INTRA-ARTICULAR; INTRAMUSCULAR
Status: DISPENSED
Start: 2022-12-19

## (undated) RX ORDER — HYDROMORPHONE HYDROCHLORIDE 1 MG/ML
INJECTION, SOLUTION INTRAMUSCULAR; INTRAVENOUS; SUBCUTANEOUS
Status: DISPENSED
Start: 2024-06-26